# Patient Record
Sex: FEMALE | Race: WHITE | Employment: OTHER | URBAN - NONMETROPOLITAN AREA
[De-identification: names, ages, dates, MRNs, and addresses within clinical notes are randomized per-mention and may not be internally consistent; named-entity substitution may affect disease eponyms.]

---

## 2017-04-05 ENCOUNTER — OFFICE VISIT (OUTPATIENT)
Dept: FAMILY MEDICINE CLINIC | Age: 52
End: 2017-04-05

## 2017-04-05 VITALS
TEMPERATURE: 98.1 F | WEIGHT: 191 LBS | DIASTOLIC BLOOD PRESSURE: 88 MMHG | BODY MASS INDEX: 33.84 KG/M2 | HEART RATE: 64 BPM | OXYGEN SATURATION: 98 % | SYSTOLIC BLOOD PRESSURE: 130 MMHG | HEIGHT: 63 IN

## 2017-04-05 DIAGNOSIS — E55.9 VITAMIN D DEFICIENCY: ICD-10-CM

## 2017-04-05 DIAGNOSIS — G89.29 HEEL PAIN, CHRONIC, LEFT: Primary | ICD-10-CM

## 2017-04-05 DIAGNOSIS — R53.83 OTHER FATIGUE: ICD-10-CM

## 2017-04-05 DIAGNOSIS — Z13.220 LIPID SCREENING: ICD-10-CM

## 2017-04-05 DIAGNOSIS — D50.9 MICROCYTIC ANEMIA: ICD-10-CM

## 2017-04-05 DIAGNOSIS — M79.672 HEEL PAIN, CHRONIC, LEFT: Primary | ICD-10-CM

## 2017-04-05 DIAGNOSIS — Z12.11 COLON CANCER SCREENING: ICD-10-CM

## 2017-04-05 LAB
ALBUMIN SERPL-MCNC: 3.9 G/DL (ref 3.9–4.9)
ALP BLD-CCNC: 153 U/L (ref 40–130)
ALT SERPL-CCNC: 29 U/L (ref 0–33)
ANION GAP SERPL CALCULATED.3IONS-SCNC: 12 MEQ/L (ref 7–13)
AST SERPL-CCNC: 36 U/L (ref 0–35)
BASOPHILS ABSOLUTE: 0.1 K/UL (ref 0–0.2)
BASOPHILS RELATIVE PERCENT: 1 %
BILIRUB SERPL-MCNC: 0.4 MG/DL (ref 0–1.2)
BUN BLDV-MCNC: 11 MG/DL (ref 6–20)
CALCIUM SERPL-MCNC: 9.1 MG/DL (ref 8.6–10.2)
CHLORIDE BLD-SCNC: 106 MEQ/L (ref 98–107)
CHOLESTEROL, TOTAL: 203 MG/DL (ref 0–199)
CO2: 24 MEQ/L (ref 22–29)
CREAT SERPL-MCNC: 0.74 MG/DL (ref 0.5–0.9)
EOSINOPHILS ABSOLUTE: 0.1 K/UL (ref 0–0.7)
EOSINOPHILS RELATIVE PERCENT: 1.6 %
GFR AFRICAN AMERICAN: >60
GFR NON-AFRICAN AMERICAN: >60
GLOBULIN: 3.4 G/DL (ref 2.3–3.5)
GLUCOSE BLD-MCNC: 86 MG/DL (ref 74–109)
HCT VFR BLD CALC: 41.7 % (ref 37–47)
HDLC SERPL-MCNC: 50 MG/DL (ref 40–59)
HEMOGLOBIN: 13.8 G/DL (ref 12–16)
IRON SATURATION: 17 % (ref 11–46)
IRON: 58 UG/DL (ref 37–145)
LDL CHOLESTEROL CALCULATED: 139 MG/DL (ref 0–129)
LYMPHOCYTES ABSOLUTE: 2.3 K/UL (ref 1–4.8)
LYMPHOCYTES RELATIVE PERCENT: 28.2 %
MCH RBC QN AUTO: 29.3 PG (ref 27–31.3)
MCHC RBC AUTO-ENTMCNC: 33.1 % (ref 33–37)
MCV RBC AUTO: 88.5 FL (ref 82–100)
MONOCYTES ABSOLUTE: 0.6 K/UL (ref 0.2–0.8)
MONOCYTES RELATIVE PERCENT: 7 %
NEUTROPHILS ABSOLUTE: 5.1 K/UL (ref 1.4–6.5)
NEUTROPHILS RELATIVE PERCENT: 62.2 %
PDW BLD-RTO: 14.1 % (ref 11.5–14.5)
PLATELET # BLD: 284 K/UL (ref 130–400)
POTASSIUM SERPL-SCNC: 4.9 MEQ/L (ref 3.5–5.1)
RBC # BLD: 4.71 M/UL (ref 4.2–5.4)
SODIUM BLD-SCNC: 142 MEQ/L (ref 132–144)
TOTAL IRON BINDING CAPACITY: 339 UG/DL (ref 178–450)
TOTAL PROTEIN: 7.3 G/DL (ref 6.4–8.1)
TRIGL SERPL-MCNC: 71 MG/DL (ref 0–200)
TSH SERPL DL<=0.05 MIU/L-ACNC: 1.35 UIU/ML (ref 0.27–4.2)
VITAMIN D 25-HYDROXY: 25.2 NG/ML (ref 30–100)
WBC # BLD: 8.1 K/UL (ref 4.8–10.8)

## 2017-04-05 PROCEDURE — 99213 OFFICE O/P EST LOW 20 MIN: CPT | Performed by: NURSE PRACTITIONER

## 2017-04-05 ASSESSMENT — ENCOUNTER SYMPTOMS
SHORTNESS OF BREATH: 0
TROUBLE SWALLOWING: 0
COUGH: 0

## 2017-05-16 ENCOUNTER — TELEPHONE (OUTPATIENT)
Dept: FAMILY MEDICINE CLINIC | Age: 52
End: 2017-05-16

## 2017-05-17 ENCOUNTER — TELEPHONE (OUTPATIENT)
Dept: FAMILY MEDICINE CLINIC | Age: 52
End: 2017-05-17

## 2017-05-17 DIAGNOSIS — Z12.11 COLON CANCER SCREENING: Primary | ICD-10-CM

## 2017-05-17 LAB
CONTROL: ABNORMAL
HEMOCCULT STL QL: ABNORMAL

## 2017-05-17 PROCEDURE — 82274 ASSAY TEST FOR BLOOD FECAL: CPT | Performed by: FAMILY MEDICINE

## 2017-05-24 ENCOUNTER — ANESTHESIA (OUTPATIENT)
Dept: ENDOSCOPY | Age: 52
End: 2017-05-24
Payer: COMMERCIAL

## 2017-05-24 ENCOUNTER — ANESTHESIA EVENT (OUTPATIENT)
Dept: ENDOSCOPY | Age: 52
End: 2017-05-24
Payer: COMMERCIAL

## 2017-05-24 ENCOUNTER — HOSPITAL ENCOUNTER (OUTPATIENT)
Age: 52
Setting detail: OUTPATIENT SURGERY
Discharge: HOME | End: 2017-05-24
Attending: INTERNAL MEDICINE | Admitting: INTERNAL MEDICINE
Payer: COMMERCIAL

## 2017-05-24 VITALS
DIASTOLIC BLOOD PRESSURE: 67 MMHG | TEMPERATURE: 98.6 F | OXYGEN SATURATION: 98 % | BODY MASS INDEX: 31.01 KG/M2 | HEIGHT: 63 IN | HEART RATE: 70 BPM | SYSTOLIC BLOOD PRESSURE: 113 MMHG | WEIGHT: 175 LBS | RESPIRATION RATE: 18 BRPM

## 2017-05-24 VITALS
RESPIRATION RATE: 16 BRPM | OXYGEN SATURATION: 96 % | SYSTOLIC BLOOD PRESSURE: 148 MMHG | DIASTOLIC BLOOD PRESSURE: 83 MMHG

## 2017-05-24 PROCEDURE — 3609027000 HC COLONOSCOPY: Performed by: INTERNAL MEDICINE

## 2017-05-24 PROCEDURE — 7100000010 HC PHASE II RECOVERY - FIRST 15 MIN: Performed by: INTERNAL MEDICINE

## 2017-05-24 PROCEDURE — 3700000000 HC ANESTHESIA ATTENDED CARE: Performed by: INTERNAL MEDICINE

## 2017-05-24 PROCEDURE — 2580000003 HC RX 258: Performed by: STUDENT IN AN ORGANIZED HEALTH CARE EDUCATION/TRAINING PROGRAM

## 2017-05-24 PROCEDURE — 6360000002 HC RX W HCPCS: Performed by: NURSE ANESTHETIST, CERTIFIED REGISTERED

## 2017-05-24 PROCEDURE — 3700000001 HC ADD 15 MINUTES (ANESTHESIA): Performed by: INTERNAL MEDICINE

## 2017-05-24 PROCEDURE — 2500000003 HC RX 250 WO HCPCS: Performed by: NURSE ANESTHETIST, CERTIFIED REGISTERED

## 2017-05-24 RX ORDER — SODIUM CHLORIDE 9 MG/ML
INJECTION, SOLUTION INTRAVENOUS CONTINUOUS
Status: DISCONTINUED | OUTPATIENT
Start: 2017-05-24 | End: 2017-05-24 | Stop reason: HOSPADM

## 2017-05-24 RX ORDER — ONDANSETRON 2 MG/ML
4 INJECTION INTRAMUSCULAR; INTRAVENOUS
Status: DISCONTINUED | OUTPATIENT
Start: 2017-05-24 | End: 2017-05-24 | Stop reason: HOSPADM

## 2017-05-24 RX ORDER — PROPOFOL 10 MG/ML
INJECTION, EMULSION INTRAVENOUS PRN
Status: DISCONTINUED | OUTPATIENT
Start: 2017-05-24 | End: 2017-05-24 | Stop reason: SDUPTHER

## 2017-05-24 RX ORDER — GLYCOPYRROLATE 0.2 MG/ML
INJECTION INTRAMUSCULAR; INTRAVENOUS PRN
Status: DISCONTINUED | OUTPATIENT
Start: 2017-05-24 | End: 2017-05-24 | Stop reason: SDUPTHER

## 2017-05-24 RX ADMIN — GLYCOPYRROLATE 0.2 MG: 0.2 INJECTION INTRAMUSCULAR; INTRAVENOUS at 14:58

## 2017-05-24 RX ADMIN — PROPOFOL 20 MG: 10 INJECTION, EMULSION INTRAVENOUS at 14:54

## 2017-05-24 RX ADMIN — PROPOFOL 20 MG: 10 INJECTION, EMULSION INTRAVENOUS at 14:57

## 2017-05-24 RX ADMIN — PROPOFOL 20 MG: 10 INJECTION, EMULSION INTRAVENOUS at 14:58

## 2017-05-24 RX ADMIN — PROPOFOL 20 MG: 10 INJECTION, EMULSION INTRAVENOUS at 14:48

## 2017-05-24 RX ADMIN — PROPOFOL 20 MG: 10 INJECTION, EMULSION INTRAVENOUS at 14:55

## 2017-05-24 RX ADMIN — PROPOFOL 50 MG: 10 INJECTION, EMULSION INTRAVENOUS at 14:47

## 2017-05-24 RX ADMIN — SODIUM CHLORIDE: 9 INJECTION, SOLUTION INTRAVENOUS at 13:54

## 2017-05-24 RX ADMIN — PROPOFOL 10 MG: 10 INJECTION, EMULSION INTRAVENOUS at 14:52

## 2017-05-24 RX ADMIN — PROPOFOL 20 MG: 10 INJECTION, EMULSION INTRAVENOUS at 14:53

## 2017-05-24 RX ADMIN — PROPOFOL 50 MG: 10 INJECTION, EMULSION INTRAVENOUS at 14:46

## 2017-05-24 RX ADMIN — PROPOFOL 50 MG: 10 INJECTION, EMULSION INTRAVENOUS at 14:50

## 2017-05-24 RX ADMIN — PROPOFOL 20 MG: 10 INJECTION, EMULSION INTRAVENOUS at 14:56

## 2017-05-24 RX ADMIN — PROPOFOL 50 MG: 10 INJECTION, EMULSION INTRAVENOUS at 14:49

## 2017-05-24 RX ADMIN — PROPOFOL 10 MG: 10 INJECTION, EMULSION INTRAVENOUS at 14:51

## 2017-05-24 ASSESSMENT — PAIN - FUNCTIONAL ASSESSMENT: PAIN_FUNCTIONAL_ASSESSMENT: 0-10

## 2017-05-25 ENCOUNTER — OFFICE VISIT (OUTPATIENT)
Dept: GASTROENTEROLOGY | Age: 52
End: 2017-05-25

## 2017-05-25 VITALS
SYSTOLIC BLOOD PRESSURE: 150 MMHG | DIASTOLIC BLOOD PRESSURE: 90 MMHG | HEART RATE: 80 BPM | BODY MASS INDEX: 31 KG/M2 | WEIGHT: 175 LBS

## 2017-05-25 DIAGNOSIS — R19.5 POSITIVE FIT (FECAL IMMUNOCHEMICAL TEST): Primary | ICD-10-CM

## 2017-05-25 PROCEDURE — 99203 OFFICE O/P NEW LOW 30 MIN: CPT | Performed by: INTERNAL MEDICINE

## 2017-07-31 ENCOUNTER — OFFICE VISIT (OUTPATIENT)
Dept: FAMILY MEDICINE CLINIC | Age: 52
End: 2017-07-31

## 2017-07-31 VITALS
SYSTOLIC BLOOD PRESSURE: 116 MMHG | TEMPERATURE: 98.9 F | HEART RATE: 60 BPM | WEIGHT: 187 LBS | OXYGEN SATURATION: 99 % | BODY MASS INDEX: 33.13 KG/M2 | DIASTOLIC BLOOD PRESSURE: 78 MMHG | HEIGHT: 63 IN

## 2017-07-31 DIAGNOSIS — E66.9 OBESITY, UNSPECIFIED OBESITY SEVERITY, UNSPECIFIED OBESITY TYPE: Primary | ICD-10-CM

## 2017-07-31 PROCEDURE — 99213 OFFICE O/P EST LOW 20 MIN: CPT | Performed by: NURSE PRACTITIONER

## 2017-07-31 RX ORDER — PHENTERMINE HYDROCHLORIDE 37.5 MG/1
37.5 TABLET ORAL
Qty: 30 TABLET | Refills: 0 | Status: SHIPPED | OUTPATIENT
Start: 2017-07-31 | End: 2017-08-28 | Stop reason: SDUPTHER

## 2017-07-31 ASSESSMENT — ENCOUNTER SYMPTOMS
COUGH: 0
SHORTNESS OF BREATH: 0

## 2017-07-31 ASSESSMENT — PATIENT HEALTH QUESTIONNAIRE - PHQ9
SUM OF ALL RESPONSES TO PHQ QUESTIONS 1-9: 0
2. FEELING DOWN, DEPRESSED OR HOPELESS: 0
1. LITTLE INTEREST OR PLEASURE IN DOING THINGS: 0
SUM OF ALL RESPONSES TO PHQ9 QUESTIONS 1 & 2: 0

## 2017-08-28 ENCOUNTER — OFFICE VISIT (OUTPATIENT)
Dept: FAMILY MEDICINE CLINIC | Age: 52
End: 2017-08-28

## 2017-08-28 VITALS
BODY MASS INDEX: 32.5 KG/M2 | TEMPERATURE: 97.7 F | OXYGEN SATURATION: 96 % | HEIGHT: 62 IN | SYSTOLIC BLOOD PRESSURE: 110 MMHG | WEIGHT: 176.6 LBS | HEART RATE: 80 BPM | DIASTOLIC BLOOD PRESSURE: 78 MMHG

## 2017-08-28 DIAGNOSIS — E66.9 OBESITY, UNSPECIFIED OBESITY SEVERITY, UNSPECIFIED OBESITY TYPE: ICD-10-CM

## 2017-08-28 PROCEDURE — 99212 OFFICE O/P EST SF 10 MIN: CPT | Performed by: NURSE PRACTITIONER

## 2017-08-28 RX ORDER — PHENTERMINE HYDROCHLORIDE 37.5 MG/1
37.5 TABLET ORAL
Qty: 30 TABLET | Refills: 0 | Status: SHIPPED | OUTPATIENT
Start: 2017-08-28 | End: 2017-09-27

## 2017-10-02 ENCOUNTER — OFFICE VISIT (OUTPATIENT)
Dept: FAMILY MEDICINE CLINIC | Age: 52
End: 2017-10-02

## 2017-10-02 VITALS
HEIGHT: 63 IN | TEMPERATURE: 97.6 F | OXYGEN SATURATION: 97 % | DIASTOLIC BLOOD PRESSURE: 80 MMHG | BODY MASS INDEX: 30.72 KG/M2 | WEIGHT: 173.4 LBS | HEART RATE: 62 BPM | SYSTOLIC BLOOD PRESSURE: 132 MMHG

## 2017-10-02 DIAGNOSIS — E66.9 OBESITY, UNSPECIFIED OBESITY SEVERITY, UNSPECIFIED OBESITY TYPE: Primary | ICD-10-CM

## 2017-10-02 PROCEDURE — 99212 OFFICE O/P EST SF 10 MIN: CPT | Performed by: NURSE PRACTITIONER

## 2017-10-02 RX ORDER — PHENTERMINE HYDROCHLORIDE 37.5 MG/1
37.5 TABLET ORAL
Qty: 30 TABLET | Refills: 0 | Status: SHIPPED | OUTPATIENT
Start: 2017-10-02 | End: 2017-11-01

## 2017-10-02 ASSESSMENT — ENCOUNTER SYMPTOMS
COUGH: 0
SHORTNESS OF BREATH: 0

## 2017-10-02 NOTE — PROGRESS NOTES
Linseed, (FLAXSEED OIL PO) Take by mouth       No current facility-administered medications on file prior to visit. Allergies   Allergen Reactions    Penicillins Hives       Review of Systems   Constitutional: Negative for fatigue. Respiratory: Negative for cough and shortness of breath. Cardiovascular: Negative for chest pain. Objective  Vitals:    10/02/17 0803   BP: 132/80   Site: Left Arm   Position: Sitting   Cuff Size: Medium Adult   Pulse: 62   Temp: 97.6 °F (36.4 °C)   TempSrc: Tympanic   SpO2: 97%   Weight: 173 lb 6.4 oz (78.7 kg)   Height: 5' 3\" (1.6 m)     Physical Exam   Constitutional: She is oriented to person, place, and time. She appears well-developed and well-nourished. Cardiovascular: Normal rate, regular rhythm, normal heart sounds and intact distal pulses. Pulmonary/Chest: Effort normal and breath sounds normal.   Neurological: She is alert and oriented to person, place, and time. Psychiatric: She has a normal mood and affect. Her behavior is normal. Judgment and thought content normal.         Assessment & Plan    1. Obesity, unspecified obesity severity, unspecified obesity type  phentermine (ADIPEX-P) 37.5 MG tablet       No orders of the defined types were placed in this encounter. Orders Placed This Encounter   Medications    phentermine (ADIPEX-P) 37.5 MG tablet     Sig: Take 1 tablet by mouth every morning (before breakfast)     Dispense:  30 tablet     Refill:  0       Side effects, adverse effects of the medication prescribed today, as well as treatment plan/ rationale and result expectations have been discussed with the patient who expresses understanding and desires to proceed. Close follow up to evaluate treatment results and for coordination of care. I have reviewed the patient's medical history in detail and updated the computerized patient record.     As always, patient is advised that if symptoms worsen in any way they will proceed to the nearest emergency room.      We will fu keila Estrada NP

## 2017-10-02 NOTE — MR AVS SNAPSHOT
Your BMI as listed above is considered obese (30 or more). BMI is an estimate of body fat, calculated from your height and weight. The higher your BMI, the greater your risk of heart disease, high blood pressure, type 2 diabetes, stroke, gallstones, arthritis, sleep apnea, and certain cancers. BMI is not perfect. It may overestimate body fat in athletes and people who are more muscular. Even a small weight loss (between 5 and 10 percent of your current weight) by decreasing your calorie intake and becoming more physically active will help lower your risk of developing or worsening diseases associated with obesity. Learn more at: [email or web address]             Today's Medication Changes          These changes are accurate as of: 10/2/17  8:15 AM.  If you have any questions, ask your nurse or doctor. START taking these medications           phentermine 37.5 MG tablet   Commonly known as:  ADIPEX-P   Instructions:   Take 1 tablet by mouth every morning (before breakfast)   Quantity:  30 tablet   Refills:  0   Started by:  Anisha Barboza NP            Where to Get Your Medications      These medications were sent to [facility] [address] - P [phone] - F [phone] [address] [address]     Phone:  [phone]     phentermine 37.5 MG tablet               Your Current Medications Are              phentermine (ADIPEX-P) 37.5 MG tablet Take 1 tablet by mouth every morning (before breakfast)    aspirin 81 MG tablet Take 81 mg by mouth daily    IRON PO Take by mouth    Multiple Vitamins-Minerals (MULTIVITAMIN ADULT PO) Take by mouth    Cholecalciferol (VITAMIN D PO) Take by mouth    Flaxseed, Linseed, (FLAXSEED OIL PO) Take by mouth      Allergies              Penicillins Hives         Additional Information        Basic Information     Date Of Birth Sex Race Ethnicity Preferred Language

## 2017-10-05 ENCOUNTER — OFFICE VISIT (OUTPATIENT)
Dept: OBGYN | Age: 52
End: 2017-10-05

## 2017-10-05 VITALS
HEIGHT: 63 IN | WEIGHT: 171 LBS | SYSTOLIC BLOOD PRESSURE: 122 MMHG | BODY MASS INDEX: 30.3 KG/M2 | DIASTOLIC BLOOD PRESSURE: 80 MMHG

## 2017-10-05 DIAGNOSIS — Z01.419 PAP SMEAR, AS PART OF ROUTINE GYNECOLOGICAL EXAMINATION: ICD-10-CM

## 2017-10-05 DIAGNOSIS — Z11.51 SCREENING FOR HPV (HUMAN PAPILLOMAVIRUS): ICD-10-CM

## 2017-10-05 DIAGNOSIS — Z01.419 WELL WOMAN EXAM WITH ROUTINE GYNECOLOGICAL EXAM: Primary | ICD-10-CM

## 2017-10-05 DIAGNOSIS — Z12.31 SCREENING MAMMOGRAM, ENCOUNTER FOR: ICD-10-CM

## 2017-10-05 PROCEDURE — 99386 PREV VISIT NEW AGE 40-64: CPT | Performed by: NURSE PRACTITIONER

## 2017-10-05 NOTE — MR AVS SNAPSHOT
After Visit Summary             Sherly Shah   10/5/2017 10:00 AM   Office Visit    Description:  Female : 1965   Provider:  Raquel Johnson CNP   Department:  [facility]              Your Follow-Up and Future Appointments         Below is a list of your follow-up and future appointments. This may not be a complete list as you may have made appointments directly with providers that we are not aware of or your providers may have made some for you. Please call your providers to confirm appointments. It is important to keep your appointments. Please bring your current insurance card, photo ID, co-pay, and all medication bottles to your appointment. If self-pay, payment is expected at the time of service. Your To-Do List     Future Appointments Provider Department Dept Phone    2017 8:00 AM JOSI Posadas [phone]    Please arrive 15 minutes prior to appointment, bring photo ID and insurance card. 10/9/2018 9:00 AM Raquel Johnson CNP [facility] [phone]    Future Orders Complete By Expires    YAYA Screening Bilateral [ Custom]  10/5/2017 2018    PAP SMEAR [LAB4 Custom]  10/5/2017 10/5/2018    Comments:    Patient History:    No LMP recorded. Patient has had an implant. OBGYN Status: Implant  Past Surgical History:  No date:  SECTION      Comment: X2  No date: EYE SURGERY Bilateral      Comment: lasik  No date: INCONTINENCE SURGERY  2017: [place] COLON CA SCRN NOT HI RSK IND N/A      Comment: COLONOSCOPY performed by German Bullard MD at [facility]  No date: TUBAL LIGATION      Smoking status: Never Smoker                                                              Smokeless status: Never Used                          Follow-Up    Return in about 1 year (around 10/5/2018), or if symptoms worsen or fail to improve, for Well Woman visit.          Information from Your Visit        Department Name Address Phone Fax    [facility] [address] [phone]      You Were Seen for:         Comments    Pap smear, as part of routine gynecological examination   [[id number]]         Vital Signs     Blood Pressure Height Weight Breastfeeding? Body Mass Index       122/80 5' 3\" (1.6 m) 171 lb (77.6 kg) No 30.29 kg/m2     Smoking Status                   Never Smoker           Additional Information about your Body Mass Index (BMI)           Your BMI as listed above is considered obese (30 or more). BMI is an estimate of body fat, calculated from your height and weight. The higher your BMI, the greater your risk of heart disease, high blood pressure, type 2 diabetes, stroke, gallstones, arthritis, sleep apnea, and certain cancers. BMI is not perfect. It may overestimate body fat in athletes and people who are more muscular. Even a small weight loss (between 5 and 10 percent of your current weight) by decreasing your calorie intake and becoming more physically active will help lower your risk of developing or worsening diseases associated with obesity. Learn more at: [employer]          Instructions         Well Visit, Women 48 to 72: Care Instructions  Your Care Instructions  Physical exams can help you stay healthy. Your doctor has checked your overall health and may have suggested ways to take good care of yourself. He or she also may have recommended tests. At home, you can help prevent illness with healthy eating, regular exercise, and other steps. Follow-up care is a key part of your treatment and safety. Be sure to make and go to all appointments, and call your doctor if you are having problems. It's also a good idea to know your test results and keep a list of the medicines you take. How can you care for yourself at home? · Reach and stay at a healthy weight.  This will lower your risk for many glaucoma and other age-related eye problems starting at age 48. · Hearing. Tell your doctor if you notice any change in your hearing. You can have tests to find out how well you hear. · Diabetes. Ask your doctor whether you should have tests for diabetes. · Colon cancer. You should begin tests for colon cancer at age 48. You may have one of several tests. Your doctor will tell you how often to have tests based on your age and risk. Risks include whether you already had a precancerous polyp removed from your colon or whether your parents, sisters and brothers, or children have had colon cancer. · Thyroid disease. Talk to your doctor about whether to have your thyroid checked as part of a regular physical exam. Women have an increased chance of a thyroid problem. · Osteoporosis. You should begin tests for bone density at age 72. If you are younger than 72, ask your doctor whether you have factors that may increase your risk for this disease. You may want to have this test before age 72. · Heart attack and stroke risk. At least every 4 to 6 years, you should have your risk for heart attack and stroke assessed. Your doctor uses factors such as your age, blood pressure, cholesterol, and whether you smoke or have diabetes to show what your risk for a heart attack or stroke is over the next 10 years. When should you call for help? Watch closely for changes in your health, and be sure to contact your doctor if you have any problems or symptoms that concern you. Where can you learn more? Go to [email or web address] and sign in to your [employer] account. Enter L760 in the [facility] box to learn more about \"Well Visit, Women 50 to 72: Care Instructions. \"     If you do not have an account, please click on the \"Sign Up Now\" link. Current as of: July 19, 2016  Content Version: 11.3  © 8433-2567 [employer].  Care instructions adapted under license by [facility] ([facility]). If you have questions about a medical condition or this instruction, always ask your healthcare professional. [place] [address] any warranty or liability for your use of this information. Breast Self-Exam: Care Instructions  Your Care Instructions  A breast self-exam is when you check your breasts for lumps or changes. This regular exam helps you learn how your breasts normally look and feel. Most breast problems or changes are not because of cancer. Breast self-exam is not a substitute for a mammogram. Having regular breast exams by your doctor and regular mammograms improve your chances of finding any problems with your breasts. Some women set a time each month to do a step-by-step breast self-exam. Other women like a less formal system. They might look at their breasts as they brush their teeth, or feel their breasts once in a while in the shower. If you notice a change in your breast, tell your doctor. Follow-up care is a key part of your treatment and safety. Be sure to make and go to all appointments, and call your doctor if you are having problems. Its also a good idea to know your test results and keep a list of the medicines you take. How do you do a breast self-exam?  · The best time to examine your breasts is usually one week after your menstrual period begins. Your breasts should not be tender then. If you do not have periods, you might do your exam on a day of the month that is easy to remember. · To examine your breasts:  ¨ Remove all your clothes above the waist and lie down. When you are lying down, your breast tissue spreads evenly over your chest wall, which makes it easier to feel all your breast tissue. ¨ Use the padsnot the fingertipsof the 3 middle fingers of your left hand to check your right breast. Move your fingers slowly in small coin-sized circles that overlap. ¨ Use three levels of pressure to feel of all your breast tissue.  Use Others keep having periods well into their 50s. And some women go through menopause early because of cancer treatment or surgery to remove the ovaries. What can you expect with menopause? · It starts with perimenopause. This is the process of change that leads up to menopause. Perimenopause can start as early as your late 35s or as late as your early 46s. How long it lasts varies. But it usually lasts from 2 to 8 years. · During this time, your hormone levels will go up and down unevenly (fluctuate). This causes changes in your periods and other symptoms. In time, estrogen and progesterone levels drop enough that the menstrual cycle stops. Going a full year without having a period is usually considered menopause. · Low estrogen levels after menopause speed bone loss. This increases your risk of osteoporosis. Also, your risk of heart disease increases after menopause. · It's normal to have thinner, dryer, wrinkled skin after menopause. The vaginal lining and the lower urinary tract also thin and weaken. This can make it hard to have sex. It can also increase the risk of vaginal and urinary tract infections. What are the symptoms? · Lighter or heavier periods. Your menstrual cycle may be longer or shorter. You may skip periods. · Hot flashes. You may have a sudden feeling of intense body heat. You may sweat, and your head, neck, and chest may get red. Along with hot flashes, you may have a heartbeat that's too fast or not regular. You may also feel anxious or grouchy. In rare cases you might feel panic. · Trouble sleeping. · Mood swings or feeling grouchy, depressed, or worried. · Problems with remembering or thinking clearly. · Vaginal dryness. Some women have only a few mild symptoms. Others have severe symptoms that disrupt their sleep and daily lives. Symptoms tend to last or get worse the first year or more after menopause.  Over time, hormones even out at low levels. Many symptoms improve or go away. But some women may have symptoms that don't go away. How are menopause symptoms treated? If you have mild symptoms, you may get some relief if you eat healthy foods, exercise, and lower your stress. Some women choose to take medicines if they have severe symptoms that aren't helped by making changes to their lifestyle. Lifestyle changes  · Choose a heart-healthy diet that is low in saturated fat. It should include plenty of fish, fruits, vegetables, beans, and high-fiber grains and breads. Be sure you get enough calcium and vitamin D to help your bones stay strong. Low-fat or nonfat dairy products are a great source of calcium. · Get regular exercise. Exercise can help you manage your weight, keep your heart and bones strong, and lift your mood. · Limit caffeine, alcohol, and stress. These things can make symptoms worse. Limiting them may help you sleep better. · If you smoke, stop. Quitting smoking can reduce hot flashes and long-term health risks. Medicines  If your symptoms are severe, talk with your doctor. You may want to try prescription medicines, such as:  · Low-dose birth control pills before menopause. · Low-dose hormone therapy (HT) after menopause. · Antidepressants. · A medicine called clonidine (Catapres) that is usually used to treat high blood pressure. All medicines for menopause symptoms have possible risks or side effects. A very small number of women develop serious health problems when taking hormone therapy. Be sure to talk to your doctor about your possible health risks before you start a treatment for menopause symptoms. Other treatments  You can try:  · Breathing exercises. They may help reduce hot flashes and emotional symptoms. · Soy. Some women feel that eating lots of soy helps even out their menopause symptoms. · Yoga or biofeedback. They may help reduce stress. bones. Your doctor can tell you the best ways to protect your bones from thinning. Follow-up care is a key part of your treatment and safety. Be sure to make and go to all appointments, and call your doctor if you are having problems. It's also a good idea to know your test results and keep a list of the medicines you take. How can you care for yourself at home? · Get enough calcium and vitamin D. The [place] of Medicine recommends adults younger than age 46 need 1,000 mg of calcium and 600 IU of vitamin D each day. Women ages 46 to 79 need 1,200 mg of calcium and 600 IU of vitamin D each day. Men ages 46 to 79 need 1,000 mg of calcium and 600 IU of vitamin D each day. Adults 71 and older need 1,200 mg of calcium and 800 IU of vitamin D each day. ¨ Eat foods rich in calcium, like yogurt, cheese, milk, and dark green vegetables. ¨ Eat foods rich in vitamin D, like eggs, fatty fish, cereal, and fortified milk. ¨ Get some sunshine. Your body uses sunshine to make its own vitamin D. The safest time to be out in the sun is before 10 a.m. or after 3 p.m. Avoid getting sunburned. Sunburn can increase your risk of skin cancer. ¨ Talk to your doctor about taking a calcium plus vitamin D supplement. Ask about what type of calcium is right for you, and how much to take at a time. Adults ages 23 to 48 should not get more than 2,500 mg of calcium and 4,000 IU of vitamin D each day, whether it is from supplements and/or food. Adults ages 46 and older should not get more than 2,000 mg of calcium and 4,000 IU of vitamin D each day from supplements and/or food. · Get regular bone-building exercise. Weight-bearing and resistance exercises keep bones healthy by working the muscles and bones against gravity. Start out at an exercise level that feels right for you. Add a little at a time until you can do the following:  ¨ Do 30 minutes of weight-bearing exercise on most days of the week. Your Current Medications Are              phentermine (ADIPEX-P) 37.5 MG tablet Take 1 tablet by mouth every morning (before breakfast)    aspirin 81 MG tablet Take 81 mg by mouth daily    IRON PO Take by mouth    Multiple Vitamins-Minerals (MULTIVITAMIN ADULT PO) Take by mouth    Cholecalciferol (VITAMIN D PO) Take by mouth    Flaxseed, Linseed, (FLAXSEED OIL PO) Take by mouth      Allergies              Penicillins Hives         Additional Information        Basic Information     Date Of Birth Sex Race Ethnicity Preferred Language    1965 Female White Non-/Non  English      Problem List as of 10/5/2017  Date Reviewed: 10/5/2017                Family history of anemia    Hypercoagulopathy ([facility] [address]    Vertigo    Microcytic anemia    Vitamin D deficiency      Preventive Care        Date Due    Hepatitis C screening is recommended for all adults regardless of risk factors born between [facility] at least once (lifetime) who have never been tested. 1965    HIV screening is recommended for all people regardless of risk factors  aged 15-65 years at least once (lifetime) who have never been HIV tested. 1/6/1979    Tetanus Combination Vaccine (1 - Tdap) 1/6/1983    Pap Smear 1/6/1985    Yearly Flu Vaccine (1) 9/1/2017    Colon Cancer Stool Test 5/17/2018    Mammograms are recommended every 2 years for low/average risk patients aged 48 - 69, and every year for high risk patients per updated national guidelines. However these guidelines can be individualized by your provider. 5/20/2018    Cholesterol Screening 4/5/2022            apartum Signup           Our records indicate that you have an active apartum account. You can view your After Visit Summary by going to [email or web address] and logging in with your apartum username and password.       If you don't have a apartum username and password but a parent or

## 2017-10-05 NOTE — PATIENT INSTRUCTIONS
Well Visit, Women 48 to 72: Care Instructions  Your Care Instructions  Physical exams can help you stay healthy. Your doctor has checked your overall health and may have suggested ways to take good care of yourself. He or she also may have recommended tests. At home, you can help prevent illness with healthy eating, regular exercise, and other steps. Follow-up care is a key part of your treatment and safety. Be sure to make and go to all appointments, and call your doctor if you are having problems. It's also a good idea to know your test results and keep a list of the medicines you take. How can you care for yourself at home? · Reach and stay at a healthy weight. This will lower your risk for many problems, such as obesity, diabetes, heart disease, and high blood pressure. · Get at least 30 minutes of exercise on most days of the week. Walking is a good choice. You also may want to do other activities, such as running, swimming, cycling, or playing tennis or team sports. · Do not smoke. Smoking can make health problems worse. If you need help quitting, talk to your doctor about stop-smoking programs and medicines. These can increase your chances of quitting for good. · Protect your skin from too much sun. When you're outdoors from 10 a.m. to 4 p.m., stay in the shade or cover up with clothing and a hat with a wide brim. Wear sunglasses that block UV rays. Even when it's cloudy, put broad-spectrum sunscreen (SPF 30 or higher) on any exposed skin. · See a dentist one or two times a year for checkups and to have your teeth cleaned. · Wear a seat belt in the car. · Limit alcohol to 1 drink a day. Too much alcohol can cause health problems. Follow your doctor's advice about when to have certain tests. These tests can spot problems early. · Cholesterol.  Your doctor will tell you how often to have this done based on your age, family history, or other things that can increase your risk for heart attack and stroke. · Blood pressure. Have your blood pressure checked during a routine doctor visit. Your doctor will tell you how often to check your blood pressure based on your age, your blood pressure results, and other factors. · Mammogram. Ask your doctor how often you should have a mammogram, which is an X-ray of your breasts. A mammogram can spot breast cancer before it can be felt and when it is easiest to treat. · Pap test and pelvic exam. Ask your doctor how often you should have a Pap test. You may not need to have a Pap test as often as you used to. · Vision. Have your eyes checked every year or two or as often as your doctor suggests. Some experts recommend that you have yearly exams for glaucoma and other age-related eye problems starting at age 48. · Hearing. Tell your doctor if you notice any change in your hearing. You can have tests to find out how well you hear. · Diabetes. Ask your doctor whether you should have tests for diabetes. · Colon cancer. You should begin tests for colon cancer at age 48. You may have one of several tests. Your doctor will tell you how often to have tests based on your age and risk. Risks include whether you already had a precancerous polyp removed from your colon or whether your parents, sisters and brothers, or children have had colon cancer. · Thyroid disease. Talk to your doctor about whether to have your thyroid checked as part of a regular physical exam. Women have an increased chance of a thyroid problem. · Osteoporosis. You should begin tests for bone density at age 72. If you are younger than 72, ask your doctor whether you have factors that may increase your risk for this disease. You may want to have this test before age 72. · Heart attack and stroke risk. At least every 4 to 6 years, you should have your risk for heart attack and stroke assessed.  Your doctor uses factors such as your age, blood pressure, cholesterol, and whether you smoke or have diabetes to your breasts is usually one week after your menstrual period begins. Your breasts should not be tender then. If you do not have periods, you might do your exam on a day of the month that is easy to remember. · To examine your breasts:  ¨ Remove all your clothes above the waist and lie down. When you are lying down, your breast tissue spreads evenly over your chest wall, which makes it easier to feel all your breast tissue. ¨ Use the padsnot the fingertipsof the 3 middle fingers of your left hand to check your right breast. Move your fingers slowly in small coin-sized circles that overlap. ¨ Use three levels of pressure to feel of all your breast tissue. Use light pressure to feel the tissue close to the skin surface. Use medium pressure to feel a little deeper. Use firm pressure to feel your tissue close to your breastbone and ribs. Use each pressure level to feel your breast tissue before moving on to the next spot. ¨ Check your entire breast, moving up and down as if following a strip from the collarbone to the bra line, and from the armpit to the ribs. Repeat until you have covered the entire breast.  ¨ Repeat this procedure for your left breast, using the pads of the 3 middle fingers of your right hand. · To examine your breasts while in the shower:  ¨ Place one arm over your head and lightly soap your breast on that side. ¨ Using the pads of your fingers, gently move your hand over your breast (in the strip pattern described above), feeling carefully for any lumps or changes. ¨ Repeat for the other breast.  · Have your doctor inspect anything you notice to see if you need further testing. Where can you learn more? Go to https://Icarus Ascendingadriana.[employer]. org and sign in to your mTraks account. Enter P148 in the [employer] box to learn more about \"Breast Self-Exam: Care Instructions. \"     If you do not have an account, please click on the \"Sign Up Now\" link.   Current as of: July 26, she can help you decide. These recommendations do not apply to women who have had a serious abnormal Pap test result or who have certain health problems. Talk to your doctor about how often you should be tested. There is also a newer test called a primary HPV test. It is used in women ages 22 and older. And it is done every 3 years, as long as a woman's test results are normal. A woman who has this test doesn't need to have a Pap test.  Having the HPV vaccine does not change your need for screening tests. Women who have had the HPV vaccine should follow the same screening schedules as women who have not had the vaccine. What happens after the test?  The sample of cells taken during your test will be sent to a lab so that an expert can look at the cells. It usually takes a week or two to get the results back. · A normal result means that the test did not find any abnormal cells in the sample. · An abnormal result can mean many things. Most of these are not cancer. The results of your test may be abnormal because:  ¨ You have an infection of the vagina or cervix, such as a yeast infection. ¨ You have an IUD (intrauterine device for birth control). ¨ You have low estrogen levels after menopause that are causing the cells to change. ¨ You have cell changes that may be a sign of precancer or cancer. The results are ranked based on how serious the changes might be. Where can you learn more? Go to https://Ipsat Therapiesadriana.healthTheravance. org and sign in to your LoveThis account. Enter P919 in the [facility] box to learn more about \"Learning About Pap Tests. \"     If you do not have an account, please click on the \"Sign Up Now\" link. Current as of: July 26, 2016  Content Version: 11.3  © 8769-7758 [email or web address]. Care instructions adapted under license by [facility] ([facility]).  If you have questions about a medical condition or this instruction, always ask your healthcare professional. Roxann Olvera with hot flashes, you may have a heartbeat that's too fast or not regular. You may also feel anxious or grouchy. In rare cases you might feel panic. · Trouble sleeping. · Mood swings or feeling grouchy, depressed, or worried. · Problems with remembering or thinking clearly. · Vaginal dryness. Some women have only a few mild symptoms. Others have severe symptoms that disrupt their sleep and daily lives. Symptoms tend to last or get worse the first year or more after menopause. Over time, hormones even out at low levels. Many symptoms improve or go away. But some women may have symptoms that don't go away. How are menopause symptoms treated? If you have mild symptoms, you may get some relief if you eat healthy foods, exercise, and lower your stress. Some women choose to take medicines if they have severe symptoms that aren't helped by making changes to their lifestyle. Lifestyle changes  · Choose a heart-healthy diet that is low in saturated fat. It should include plenty of fish, fruits, vegetables, beans, and high-fiber grains and breads. Be sure you get enough calcium and vitamin D to help your bones stay strong. Low-fat or nonfat dairy products are a great source of calcium. · Get regular exercise. Exercise can help you manage your weight, keep your heart and bones strong, and lift your mood. · Limit caffeine, alcohol, and stress. These things can make symptoms worse. Limiting them may help you sleep better. · If you smoke, stop. Quitting smoking can reduce hot flashes and long-term health risks. Medicines  If your symptoms are severe, talk with your doctor. You may want to try prescription medicines, such as:  · Low-dose birth control pills before menopause. · Low-dose hormone therapy (HT) after menopause. · Antidepressants. · A medicine called clonidine (Catapres) that is usually used to treat high blood pressure. All medicines for menopause symptoms have possible risks or side effects.  A very small number of women develop serious health problems when taking hormone therapy. Be sure to talk to your doctor about your possible health risks before you start a treatment for menopause symptoms. Other treatments  You can try:  · Breathing exercises. They may help reduce hot flashes and emotional symptoms. · Soy. Some women feel that eating lots of soy helps even out their menopause symptoms. · Yoga or biofeedback. They may help reduce stress. Follow-up care is a key part of your treatment and safety. Be sure to make and go to all appointments, and call your doctor if you are having problems. It's also a good idea to know your test results and keep a list of the medicines you take. Where can you learn more? Go to https://MathZeepeCloudShield Technologieseb.Beatsy. org and sign in to your [employer]ostrong account. Enter H199 in the [employer] box to learn more about \"Learning About Menopause. \"     If you do not have an account, please click on the \"Sign Up Now\" link. Current as of: October 13, 2016  Content Version: 11.3  © 2047-7961 [email or web address]. Care instructions adapted under license by [facility] ([facility]). If you have questions about a medical condition or this instruction, always ask your healthcare professional. [place] [address] any warranty or liability for your use of this information. Preventing Osteoporosis: Care Instructions  Your Care Instructions  Osteoporosis means the bones are weak and thin enough that they can break easily. The older you are, the more likely you are to get osteoporosis. But with plenty of calcium, vitamin D, and exercise, you can help prevent osteoporosis. The preteen and teen years are a key time for bone building. With the help of calcium, vitamin D, and exercise in those early years and beyond, the bones reach their peak density and strength by age 27. After age 27, your bones naturally start to thin and weaken.   The stronger your bones are at around age 27, the lower your risk for osteoporosis. But no matter what your age and risk are, your bones still need calcium, vitamin D, and exercise to stay strong. Also avoid smoking, and limit alcohol. Smoking and heavy alcohol use can make your bones thinner. Talk to your doctor about any special risks you might have, such as having a close relative with osteoporosis or taking a medicine that can weaken bones. Your doctor can tell you the best ways to protect your bones from thinning. Follow-up care is a key part of your treatment and safety. Be sure to make and go to all appointments, and call your doctor if you are having problems. It's also a good idea to know your test results and keep a list of the medicines you take. How can you care for yourself at home? · Get enough calcium and vitamin D. The [place] of [employer] recommends adults younger than age 46 need 1,000 mg of calcium and 600 IU of vitamin D each day. Women ages 46 to 79 need 1,200 mg of calcium and 600 IU of vitamin D each day. Men ages 46 to 79 need 1,000 mg of calcium and 600 IU of vitamin D each day. Adults 71 and older need 1,200 mg of calcium and 800 IU of vitamin D each day. ¨ Eat foods rich in calcium, like yogurt, cheese, milk, and dark green vegetables. ¨ Eat foods rich in vitamin D, like eggs, fatty fish, cereal, and fortified milk. ¨ Get some sunshine. Your body uses sunshine to make its own vitamin D. The safest time to be out in the sun is before 10 a.m. or after 3 p.m. Avoid getting sunburned. Sunburn can increase your risk of skin cancer. ¨ Talk to your doctor about taking a calcium plus vitamin D supplement. Ask about what type of calcium is right for you, and how much to take at a time. Adults ages 23 to 48 should not get more than 2,500 mg of calcium and 4,000 IU of vitamin D each day, whether it is from supplements and/or food.  Adults ages 46 and older should not get more than 2,000 mg of calcium and 4,000 IU of vitamin D [facility] ([facility]). If you have questions about a medical condition or this instruction, always ask your healthcare professional. [place] [address] any warranty or liability for your use of this information.

## 2017-10-05 NOTE — PROGRESS NOTES
The patient was asked if she would like a chaperone present for her intimate exam. She  Declined the chaperone.  Rose Mo  Patient accepts Physician Assistant to be present and/or perform exam

## 2017-10-05 NOTE — PROGRESS NOTES
SUBJECTIVE: 47 y.o. [id number] female here for well woman exam. Pt is with no menses since 3 months after IUD was placed due to heavy periods. Pt has no complaints today. Pt reports menopause s/s as none. Pt denies smoking and drinks ETOH occasionally. Pt reports regular exercise and takes a multivitamin. Pt reports she has had a recent colonoscopy and is due for mammogram. Pt reports she has a h/o a \"blood clotting disorder\" and is concerned about her blot clot risk. Review of Systems:   General ROS: negative   Psychological ROS: negative   EENT ROS: negative   Endocrine ROS: negative   Respiratory ROS: no cough, shortness of breath, or wheezing   Cardiovascular ROS: no chest pain or dyspnea on exertion   Gastrointestinal ROS: no abdominal pain, change in bowel habits, or black or bloody stools   Genito-Urinary ROS: no dysuria, trouble voiding, or hematuria   Musculoskeletal ROS: negative   Neurological ROS: no TIA or stroke symptoms   Dermatological ROS: negative     OBJECTIVE: Please see chart for additional documentation   /80  Ht 5' 3\" (1.6 m)  Wt 171 lb (77.6 kg)  LMP Comment: Mirena (IUD)  Breastfeeding? No  BMI 30.29 kg/m2     Pt's medical and surgical history reviewed    Physical Exam:  GENERAL:  She appears well, afebrile. NEUROLOGIC: Alert and oriented to person, place and time  NECK: no thyroidmegaly  BREASTS: Bilateral breasts without masses, skin changes or nipple discharge   LUNGS: Clear to ascultation bilaterally  CVS: regular rate and rhythm  LYMPHATIC: No palpable lymph nodes  ABDOMEN: benign, soft, nontender, no masses, bowel sounds active all four quadrants. No liver or splenic organomegaly.    SKIN: atrophic changes noted, warm and dry, normal texture and tone, no lesions    Pelvic Exam:   EFG: normal external genitalia  URETHRA: normal appearing without diverticula or lesions  VULVA: normal appearing vulva with no masses, tenderness or lesions  VAGINA: normal rugae, white

## 2017-10-10 DIAGNOSIS — Z11.51 SCREENING FOR HPV (HUMAN PAPILLOMAVIRUS): ICD-10-CM

## 2017-10-10 DIAGNOSIS — Z01.419 PAP SMEAR, AS PART OF ROUTINE GYNECOLOGICAL EXAMINATION: ICD-10-CM

## 2017-11-02 ENCOUNTER — HOSPITAL ENCOUNTER (OUTPATIENT)
Dept: WOMENS IMAGING | Age: 52
Discharge: HOME | End: 2017-11-02
Payer: COMMERCIAL

## 2017-11-02 DIAGNOSIS — Z12.31 SCREENING MAMMOGRAM, ENCOUNTER FOR: ICD-10-CM

## 2017-11-02 PROCEDURE — G0202 SCR MAMMO BI INCL CAD: HCPCS

## 2018-10-23 ENCOUNTER — OFFICE VISIT (OUTPATIENT)
Dept: OBGYN CLINIC | Age: 53
End: 2018-10-23
Payer: COMMERCIAL

## 2018-10-23 VITALS
BODY MASS INDEX: 32.96 KG/M2 | DIASTOLIC BLOOD PRESSURE: 84 MMHG | HEIGHT: 63 IN | SYSTOLIC BLOOD PRESSURE: 122 MMHG | WEIGHT: 186 LBS

## 2018-10-23 DIAGNOSIS — Z97.5 IUD (INTRAUTERINE DEVICE) IN PLACE: ICD-10-CM

## 2018-10-23 DIAGNOSIS — Z12.31 SCREENING MAMMOGRAM, ENCOUNTER FOR: ICD-10-CM

## 2018-10-23 DIAGNOSIS — Z00.00 WELL WOMAN EXAM WITHOUT GYNECOLOGICAL EXAM: Primary | ICD-10-CM

## 2018-10-23 DIAGNOSIS — N95.1 PERIMENOPAUSE: ICD-10-CM

## 2018-10-23 PROCEDURE — 99396 PREV VISIT EST AGE 40-64: CPT | Performed by: NURSE PRACTITIONER

## 2018-10-23 ASSESSMENT — PATIENT HEALTH QUESTIONNAIRE - PHQ9
SUM OF ALL RESPONSES TO PHQ QUESTIONS 1-9: 1
2. FEELING DOWN, DEPRESSED OR HOPELESS: 1
SUM OF ALL RESPONSES TO PHQ QUESTIONS 1-9: 1
SUM OF ALL RESPONSES TO PHQ9 QUESTIONS 1 & 2: 1
1. LITTLE INTEREST OR PLEASURE IN DOING THINGS: 0

## 2018-10-23 NOTE — PATIENT INSTRUCTIONS
Patient Education        Well Visit, Women 48 to 72: Care Instructions  Your Care Instructions    Physical exams can help you stay healthy. Your doctor has checked your overall health and may have suggested ways to take good care of yourself. He or she also may have recommended tests. At home, you can help prevent illness with healthy eating, regular exercise, and other steps. Follow-up care is a key part of your treatment and safety. Be sure to make and go to all appointments, and call your doctor if you are having problems. It's also a good idea to know your test results and keep a list of the medicines you take. How can you care for yourself at home? · Reach and stay at a healthy weight. This will lower your risk for many problems, such as obesity, diabetes, heart disease, and high blood pressure. · Get at least 30 minutes of exercise on most days of the week. Walking is a good choice. You also may want to do other activities, such as running, swimming, cycling, or playing tennis or team sports. · Do not smoke. Smoking can make health problems worse. If you need help quitting, talk to your doctor about stop-smoking programs and medicines. These can increase your chances of quitting for good. · Protect your skin from too much sun. When you're outdoors from 10 a.m. to 4 p.m., stay in the shade or cover up with clothing and a hat with a wide brim. Wear sunglasses that block UV rays. Even when it's cloudy, put broad-spectrum sunscreen (SPF 30 or higher) on any exposed skin. · See a dentist one or two times a year for checkups and to have your teeth cleaned. · Wear a seat belt in the car. · Limit alcohol to 1 drink a day. Too much alcohol can cause health problems. Follow your doctor's advice about when to have certain tests. These tests can spot problems early. · Cholesterol.  Your doctor will tell you how often to have this done based on your age, family history, or other things that can increase your smoke or have diabetes to show what your risk for a heart attack or stroke is over the next 10 years. When should you call for help? Watch closely for changes in your health, and be sure to contact your doctor if you have any problems or symptoms that concern you. Where can you learn more? Go to https://chpepiceweb.healthFlow Studio. org and sign in to your [employer] account. Enter J958 in the [facility] box to learn more about \"Well Visit, Women 50 to 72: Care Instructions. \"     If you do not have an account, please click on the \"Sign Up Now\" link. Current as of: May 16, 2017  Content Version: 11.7  © 3195-8768 Tubis. Care instructions adapted under license by [facility] ([facility]). If you have questions about a medical condition or this instruction, always ask your healthcare professional. [place] [address] any warranty or liability for your use of this information. Patient Education        Breast Self-Exam: Care Instructions  Your Care Instructions    A breast self-exam is when you check your breasts for lumps or changes. This regular exam helps you learn how your breasts normally look and feel. Most breast problems or changes are not because of cancer. Breast self-exam is not a substitute for a mammogram. Having regular breast exams by your doctor and regular mammograms improve your chances of finding any problems with your breasts. Some women set a time each month to do a step-by-step breast self-exam. Other women like a less formal system. They might look at their breasts as they brush their teeth, or feel their breasts once in a while in the shower. If you notice a change in your breast, tell your doctor. Follow-up care is a key part of your treatment and safety. Be sure to make and go to all appointments, and call your doctor if you are having problems. It's also a good idea to know your test results and keep a list of the medicines you take.   How do you do a breast self-exam?  · The best time to examine your breasts is usually one week after your menstrual period begins. Your breasts should not be tender then. If you do not have periods, you might do your exam on a day of the month that is easy to remember. · To examine your breasts:  ¨ Remove all your clothes above the waist and lie down. When you are lying down, your breast tissue spreads evenly over your chest wall, which makes it easier to feel all your breast tissue. ¨ Use the pads-not the fingertips-of the 3 middle fingers of your left hand to check your right breast. Move your fingers slowly in small coin-sized circles that overlap. ¨ Use three levels of pressure to feel of all your breast tissue. Use light pressure to feel the tissue close to the skin surface. Use medium pressure to feel a little deeper. Use firm pressure to feel your tissue close to your breastbone and ribs. Use each pressure level to feel your breast tissue before moving on to the next spot. ¨ Check your entire breast, moving up and down as if following a strip from the collarbone to the bra line, and from the armpit to the ribs. Repeat until you have covered the entire breast.  ¨ Repeat this procedure for your left breast, using the pads of the 3 middle fingers of your right hand. · To examine your breasts while in the shower:  ¨ Place one arm over your head and lightly soap your breast on that side. ¨ Using the pads of your fingers, gently move your hand over your breast (in the strip pattern described above), feeling carefully for any lumps or changes. ¨ Repeat for the other breast.  · Have your doctor inspect anything you notice to see if you need further testing. Where can you learn more? Go to https://lorin.Hello! Messenger. org and sign in to your TeeBeeDee account. Enter P148 in the [facility] box to learn more about \"Breast Self-Exam: Care Instructions. \"     If you do not have an account, please click on the \"Sign Up Now\" link. Current as of: May 12, 2017  Content Version: 11.7  © 8107-6275 Artimi. Care instructions adapted under license by [facility] ([facility]). If you have questions about a medical condition or this instruction, always ask your healthcare professional. Maggy Rodrigez any warranty or liability for your use of this information. Patient Education        Learning About Cervical Cancer Screening  What is a cervical cancer screening test?    Cervical cancer screening tests check for cancer of the cervix. The cervix is the lower part of the uterus that opens into the vagina. The test can help your doctor find early changes in the cells that could lead to cancer. Two tests can be used to screen for cervical cancer. They may be used alone or together. A Pap test.   This test looks for changes in the cells of the cervix. Abnormal cells may be a sign of cancer. A human papillomavirus (HPV) test.   This test looks for the HPV virus. Some types of HPV can cause cancer. During either test, the doctor or nurse will insert a tool called a speculum into your vagina. The speculum gently spreads apart the vaginal walls. It allows your doctor to see inside the vagina and the cervix. He or she uses a small swab or brush to collect cell samples from your cervix. Try to schedule the test when you're not having your period. To get ready for the test, avoid douches, tampons, vaginal medicines, sprays, and powders for at least a day before you have the test.  When should you have a screening test?  These guidelines apply to women who are at average risk of cervical cancer. If you don't know your risk, talk with your doctor. Women 21 to 34  · You can start having a Pap test at age 24. It is done every 3 years. · You can start having the primary HPV test at age 22. It is done every 3 years.   Women 30 to 59  · If you had both a Pap test and an HPV test last time and both were normal, please click on the \"Sign Up Now\" link. Current as of: October 6, 2017  Content Version: 11.7  © [id number] Demandbase. Care instructions adapted under license by [facility] ([facility]). If you have questions about a medical condition or this instruction, always ask your healthcare professional. [address] any warranty or liability for your use of this information. Patient Education        Preventing Osteoporosis: Care Instructions  Your Care Instructions    Osteoporosis means the bones are weak and thin enough that they can break easily. The older you are, the more likely you are to get osteoporosis. But with plenty of calcium, vitamin D, and exercise, you can help prevent osteoporosis. The preteen and teen years are a key time for bone building. With the help of calcium, vitamin D, and exercise in those early years and beyond, the bones reach their peak density and strength by age 27. After age 27, your bones naturally start to thin and weaken. The stronger your bones are at around age 27, the lower your risk for osteoporosis. But no matter what your age and risk are, your bones still need calcium, vitamin D, and exercise to stay strong. Also avoid smoking, and limit alcohol. Smoking and heavy alcohol use can make your bones thinner. Talk to your doctor about any special risks you might have, such as having a close relative with osteoporosis or taking a medicine that can weaken bones. Your doctor can tell you the best ways to protect your bones from thinning. Follow-up care is a key part of your treatment and safety. Be sure to make and go to all appointments, and call your doctor if you are having problems. It's also a good idea to know your test results and keep a list of the medicines you take. How can you care for yourself at home? · Get enough calcium and vitamin D.  The [place] [employer] recommends adults younger than age 46 need 1,000 mg of calcium and 600 IU of

## 2018-11-06 ENCOUNTER — HOSPITAL ENCOUNTER (OUTPATIENT)
Dept: WOMENS IMAGING | Age: 53
Discharge: HOME | End: 2018-11-08
Payer: COMMERCIAL

## 2018-11-06 DIAGNOSIS — Z12.31 SCREENING MAMMOGRAM, ENCOUNTER FOR: ICD-10-CM

## 2018-11-06 PROCEDURE — 77067 SCR MAMMO BI INCL CAD: CPT

## 2018-11-07 ENCOUNTER — TELEPHONE (OUTPATIENT)
Dept: OBGYN CLINIC | Age: 53
End: 2018-11-07

## 2018-11-07 DIAGNOSIS — R92.8 ABNORMAL MAMMOGRAM: Primary | ICD-10-CM

## 2018-11-13 ENCOUNTER — HOSPITAL ENCOUNTER (OUTPATIENT)
Dept: ULTRASOUND IMAGING | Age: 53
Discharge: HOME | End: 2018-11-15
Payer: COMMERCIAL

## 2018-11-13 ENCOUNTER — HOSPITAL ENCOUNTER (OUTPATIENT)
Dept: WOMENS IMAGING | Age: 53
Discharge: HOME | End: 2018-11-15
Payer: COMMERCIAL

## 2018-11-13 DIAGNOSIS — R92.8 ABNORMAL MAMMOGRAM: ICD-10-CM

## 2018-11-13 PROCEDURE — 76642 ULTRASOUND BREAST LIMITED: CPT

## 2018-11-13 PROCEDURE — G0279 TOMOSYNTHESIS, MAMMO: HCPCS

## 2018-11-14 DIAGNOSIS — R92.8 ABNORMAL FINDINGS ON DIAGNOSTIC IMAGING OF BREAST: Primary | ICD-10-CM

## 2018-11-19 ENCOUNTER — TELEPHONE (OUTPATIENT)
Dept: OBGYN CLINIC | Age: 53
End: 2018-11-19

## 2018-11-19 ENCOUNTER — OFFICE VISIT (OUTPATIENT)
Dept: SURGERY | Age: 53
End: 2018-11-19
Payer: COMMERCIAL

## 2018-11-19 VITALS
WEIGHT: 187 LBS | DIASTOLIC BLOOD PRESSURE: 78 MMHG | BODY MASS INDEX: 33.13 KG/M2 | SYSTOLIC BLOOD PRESSURE: 122 MMHG | HEIGHT: 63 IN | TEMPERATURE: 97.7 F

## 2018-11-19 DIAGNOSIS — R92.8 ABNORMAL ULTRASOUND OF BREAST: ICD-10-CM

## 2018-11-19 DIAGNOSIS — I72.9 ANEURYSM (HCC): Primary | ICD-10-CM

## 2018-11-19 PROCEDURE — 99202 OFFICE O/P NEW SF 15 MIN: CPT | Performed by: SURGERY

## 2018-12-12 ENCOUNTER — HOSPITAL ENCOUNTER (OUTPATIENT)
Dept: ULTRASOUND IMAGING | Age: 53
Discharge: HOME | End: 2018-12-14
Payer: COMMERCIAL

## 2018-12-12 DIAGNOSIS — I72.9 ANEURYSM (HCC): ICD-10-CM

## 2018-12-12 PROCEDURE — 76706 US ABDL AORTA SCREEN AAA: CPT

## 2018-12-21 ENCOUNTER — TELEPHONE (OUTPATIENT)
Dept: SURGERY | Age: 53
End: 2018-12-21

## 2019-04-15 ENCOUNTER — OFFICE VISIT (OUTPATIENT)
Dept: INTERVENTIONAL RADIOLOGY/VASCULAR | Age: 54
End: 2019-04-15
Payer: COMMERCIAL

## 2019-04-15 VITALS
BODY MASS INDEX: 35.14 KG/M2 | OXYGEN SATURATION: 98 % | HEART RATE: 70 BPM | RESPIRATION RATE: 14 BRPM | WEIGHT: 198.4 LBS | DIASTOLIC BLOOD PRESSURE: 84 MMHG | SYSTOLIC BLOOD PRESSURE: 110 MMHG

## 2019-04-15 DIAGNOSIS — I78.1 ASYMPTOMATIC TELANGIECTASIA: ICD-10-CM

## 2019-04-15 DIAGNOSIS — M79.669 PAIN AND SWELLING OF LOWER LEG, UNSPECIFIED LATERALITY: Primary | ICD-10-CM

## 2019-04-15 DIAGNOSIS — M79.89 PAIN AND SWELLING OF LOWER LEG, UNSPECIFIED LATERALITY: Primary | ICD-10-CM

## 2019-04-15 DIAGNOSIS — I87.2 VENOUS INSUFFICIENCY OF LEFT LOWER EXTREMITY: ICD-10-CM

## 2019-04-15 PROCEDURE — 99244 OFF/OP CNSLTJ NEW/EST MOD 40: CPT | Performed by: NURSE PRACTITIONER

## 2019-04-15 ASSESSMENT — ENCOUNTER SYMPTOMS
BACK PAIN: 0
DIARRHEA: 0
NAUSEA: 0
EYE REDNESS: 0
EYE PAIN: 0
SINUS PRESSURE: 0
COUGH: 0
SHORTNESS OF BREATH: 0
TROUBLE SWALLOWING: 0
VOMITING: 0
EYE DISCHARGE: 0
SORE THROAT: 0
SINUS PAIN: 0
ABDOMINAL DISTENTION: 0
ABDOMINAL PAIN: 0
EYE ITCHING: 0
WHEEZING: 0

## 2019-04-15 NOTE — PROGRESS NOTES
Spouse name: None    Number of children: None    Years of education: None    Highest education level: None   Occupational History    None   Social Needs    Financial resource strain: None    Food insecurity:     Worry: None     Inability: None    Transportation needs:     Medical: None     Non-medical: None   Tobacco Use    Smoking status: Never Smoker    Smokeless tobacco: Never Used   Substance and Sexual Activity    Alcohol use: Yes     Comment: Ever Couple Months    Drug use: No    Sexual activity: Yes   Lifestyle    Physical activity:     Days per week: None     Minutes per session: None    Stress: None   Relationships    Social connections:     Talks on phone: None     Gets together: None     Attends Restorationism service: None     Active member of club or organization: None     Attends meetings of clubs or organizations: None     Relationship status: None    Intimate partner violence:     Fear of current or ex partner: None     Emotionally abused: None     Physically abused: None     Forced sexual activity: None   Other Topics Concern    None   Social History Narrative    None       Allergies   Allergen Reactions    Penicillins Hives       Current Outpatient Medications on File Prior to Visit   Medication Sig Dispense Refill    aspirin 81 MG tablet Take 81 mg by mouth daily      Multiple Vitamins-Minerals (MULTIVITAMIN ADULT PO) Take by mouth      Cholecalciferol (VITAMIN D PO) Take by mouth      Flaxseed, Linseed, (FLAXSEED OIL PO) Take by mouth       No current facility-administered medications on file prior to visit. Review of Systems   Constitutional: Negative for activity change, appetite change, chills, fatigue and fever. HENT: Negative for congestion, ear pain, nosebleeds, sinus pressure, sinus pain, sore throat and trouble swallowing. Eyes: Negative for pain, discharge, redness and itching. Respiratory: Negative for cough, shortness of breath and wheezing. Cardiovascular: Negative for chest pain, palpitations and leg swelling. Gastrointestinal: Negative for abdominal distention, abdominal pain, diarrhea, nausea and vomiting. Endocrine: Negative for cold intolerance and heat intolerance. Genitourinary: Negative for dysuria, frequency, hematuria and urgency. Musculoskeletal: Negative for arthralgias, back pain, neck pain and neck stiffness. Skin: Negative for rash and wound. Neurological: Negative for dizziness, seizures, weakness, light-headedness and headaches. Hematological: Negative for adenopathy. Bruises/bleeds easily. Psychiatric/Behavioral: The patient is not nervous/anxious. OBJECTIVE:  /84   Pulse 70   Resp 14   Wt 198 lb 6.4 oz (90 kg)   SpO2 98%   BMI 35.14 kg/m²      Physical Exam   Constitutional: She is oriented to person, place, and time. She appears well-developed and well-nourished. No distress. HENT:   Head: Normocephalic and atraumatic. Right Ear: External ear normal.   Left Ear: External ear normal.   Mouth/Throat: Oropharynx is clear and moist. No oropharyngeal exudate. Eyes: Pupils are equal, round, and reactive to light. Conjunctivae are normal. Right eye exhibits no discharge. Left eye exhibits no discharge. No scleral icterus. Neck: Normal range of motion. Neck supple. No JVD present. No tracheal deviation present. No thyromegaly present. Cardiovascular: Normal rate, regular rhythm, normal heart sounds and intact distal pulses. Exam reveals no gallop and no friction rub. No murmur heard. Pulses:       Popliteal pulses are 2+ on the right side, and 2+ on the left side. Dorsalis pedis pulses are 2+ on the right side, and 2+ on the left side. Posterior tibial pulses are 2+ on the right side, and 2+ on the left side. Pulmonary/Chest: Effort normal and breath sounds normal. No stridor. No respiratory distress. She has no wheezes. She has no rales. She exhibits no tenderness.    Abdominal: Soft. Bowel sounds are normal. She exhibits no distension and no mass. There is no tenderness. There is no rebound and no guarding. Musculoskeletal: She exhibits tenderness (with palpation to LE's. ). She exhibits no edema or deformity. Neurological: She is alert and oriented to person, place, and time. No cranial nerve deficit. Coordination normal.   Skin: Skin is warm and dry. No rash noted. She is not diaphoretic. No erythema. No pallor. Psychiatric: She has a normal mood and affect. Her behavior is normal. Judgment and thought content normal.       ASSESSMENT AND PLAN:    Assessment:   1. US in office demonstrates Venous Insufficiency to left GSV at level of mid thigh and proximal calf causing chronic aching for several years and multiple telangectasia that are tender to touch. Her legs are tender to touch. Not relieved by compression therapy of prescription strength. 2. Varicose Veins: Telangectasia to biltateral legs causing some tenderness to touch to Left leg. There are VV on RLE that she does not like appearance and would consider cosmetic surgery to them as this would be OOP cost to her. Plan:   1. Submit to insurance for Left GSV RFA and distal sclerotherapy. GSV is above sheath at level of mid thigh. Submit for LLE VV sclerotherapy. 2. VV cosmetic therapy to RLE. Procedure with risks instructed. She wishes to obtain information regarding OOP cost for cosmetic VV therapy and if insurance approval and then will call back office if she wishes to proceed. 3. RX given for thigh high compression to get fitted if she wishes to proceed with procedures. It was instructed to her that compression will help to slow progression of VV.      Aydee Perez, APRN - CNP

## 2019-05-14 ENCOUNTER — PROCEDURE VISIT (OUTPATIENT)
Dept: INTERVENTIONAL RADIOLOGY/VASCULAR | Age: 54
End: 2019-05-14
Payer: COMMERCIAL

## 2019-05-14 VITALS
DIASTOLIC BLOOD PRESSURE: 82 MMHG | BODY MASS INDEX: 35.07 KG/M2 | HEART RATE: 64 BPM | WEIGHT: 198 LBS | OXYGEN SATURATION: 98 % | SYSTOLIC BLOOD PRESSURE: 160 MMHG

## 2019-05-14 DIAGNOSIS — I78.1 ASYMPTOMATIC TELANGIECTASIA: Primary | ICD-10-CM

## 2019-05-14 PROCEDURE — 36471 NJX SCLRSNT MLT INCMPTNT VN: CPT | Performed by: RADIOLOGY

## 2019-05-14 NOTE — PATIENT INSTRUCTIONS
[address]  PHONE:  [phone]                                                              POST PROCEDURE INSTRUCTIONS                   1.    Wear compression stockings around the clock for 2 days. 2.    Sponge bath only. DO NOT shower for those 2 days. 3.    Do not drive for 24 hours after the procedure. 4.    You may remove the compression stocking after 2 days. 5.    You may shower after 2 days. 6.    Wear your compression stockings daily while awake. Put them on in the morning upon                           awakening, the legs are usually less swollen and they are easier to put on.              7.   Remove the compression stocking before bed. Hand wash and hang dry overnight as needed. 8.   Keep legs elevated above the heart level as often as possible. 9.   No jacuzzi's, hot tube, or bath for 2 weeks. 10.  No exercise or strenuous activity for 2 weeks. 11.  Do not lift anything greater than 5 pounds (one gallon of milk) for 2 weeks. 12   No sun exposure to the legs for one month. Wear pants or long skirts. No tanning beds. 13.  Use over the counter ibuprofen (Motrin) 400mg (2 tablets) 3 times a day as needed for pain. 14.  Follow up with Dr. Gila Piedra in 2 weeks as scheduled.   Patient Education

## 2019-09-10 ENCOUNTER — OFFICE VISIT (OUTPATIENT)
Dept: FAMILY MEDICINE CLINIC | Age: 54
End: 2019-09-10
Payer: COMMERCIAL

## 2019-09-10 VITALS
HEART RATE: 60 BPM | DIASTOLIC BLOOD PRESSURE: 88 MMHG | SYSTOLIC BLOOD PRESSURE: 164 MMHG | BODY MASS INDEX: 34.69 KG/M2 | TEMPERATURE: 97.9 F | WEIGHT: 195.8 LBS | OXYGEN SATURATION: 97 % | HEIGHT: 63 IN

## 2019-09-10 DIAGNOSIS — R03.0 ELEVATED BLOOD PRESSURE READING: ICD-10-CM

## 2019-09-10 DIAGNOSIS — R53.83 OTHER FATIGUE: ICD-10-CM

## 2019-09-10 DIAGNOSIS — R03.0 ELEVATED BLOOD PRESSURE READING: Primary | ICD-10-CM

## 2019-09-10 LAB
ALBUMIN SERPL-MCNC: 3.9 G/DL (ref 3.5–4.6)
ALP BLD-CCNC: 157 U/L (ref 40–130)
ALT SERPL-CCNC: 16 U/L (ref 0–33)
ANION GAP SERPL CALCULATED.3IONS-SCNC: 13 MEQ/L (ref 9–15)
AST SERPL-CCNC: 21 U/L (ref 0–35)
BASOPHILS ABSOLUTE: 0.1 K/UL (ref 0–0.2)
BASOPHILS RELATIVE PERCENT: 1.3 %
BILIRUB SERPL-MCNC: 0.3 MG/DL (ref 0.2–0.7)
BUN BLDV-MCNC: 9 MG/DL (ref 6–20)
CALCIUM SERPL-MCNC: 9.2 MG/DL (ref 8.5–9.9)
CHLORIDE BLD-SCNC: 103 MEQ/L (ref 95–107)
CO2: 26 MEQ/L (ref 20–31)
CREAT SERPL-MCNC: 0.75 MG/DL (ref 0.5–0.9)
EOSINOPHILS ABSOLUTE: 0.1 K/UL (ref 0–0.7)
EOSINOPHILS RELATIVE PERCENT: 1.4 %
GFR AFRICAN AMERICAN: >60
GFR NON-AFRICAN AMERICAN: >60
GLOBULIN: 4 G/DL (ref 2.3–3.5)
GLUCOSE BLD-MCNC: 102 MG/DL (ref 70–99)
HBA1C MFR BLD: 5.9 % (ref 4.8–5.9)
HCT VFR BLD CALC: 41 % (ref 37–47)
HEMOGLOBIN: 13.1 G/DL (ref 12–16)
IRON SATURATION: 13 % (ref 11–46)
IRON: 42 UG/DL (ref 37–145)
LYMPHOCYTES ABSOLUTE: 2.3 K/UL (ref 1–4.8)
LYMPHOCYTES RELATIVE PERCENT: 29.4 %
MCH RBC QN AUTO: 28.9 PG (ref 27–31.3)
MCHC RBC AUTO-ENTMCNC: 32.1 % (ref 33–37)
MCV RBC AUTO: 90.1 FL (ref 82–100)
MONOCYTES ABSOLUTE: 0.6 K/UL (ref 0.2–0.8)
MONOCYTES RELATIVE PERCENT: 8.2 %
NEUTROPHILS ABSOLUTE: 4.6 K/UL (ref 1.4–6.5)
NEUTROPHILS RELATIVE PERCENT: 59.7 %
PDW BLD-RTO: 14.6 % (ref 11.5–14.5)
PLATELET # BLD: 322 K/UL (ref 130–400)
POTASSIUM SERPL-SCNC: 4 MEQ/L (ref 3.4–4.9)
RBC # BLD: 4.55 M/UL (ref 4.2–5.4)
SODIUM BLD-SCNC: 142 MEQ/L (ref 135–144)
TOTAL IRON BINDING CAPACITY: 321 UG/DL (ref 178–450)
TOTAL PROTEIN: 7.9 G/DL (ref 6.3–8)
TSH SERPL DL<=0.05 MIU/L-ACNC: 1.78 UIU/ML (ref 0.44–3.86)
WBC # BLD: 7.7 K/UL (ref 4.8–10.8)

## 2019-09-10 PROCEDURE — 99214 OFFICE O/P EST MOD 30 MIN: CPT | Performed by: NURSE PRACTITIONER

## 2019-09-10 ASSESSMENT — ENCOUNTER SYMPTOMS
COUGH: 0
SHORTNESS OF BREATH: 0

## 2019-09-10 ASSESSMENT — PATIENT HEALTH QUESTIONNAIRE - PHQ9
1. LITTLE INTEREST OR PLEASURE IN DOING THINGS: 0
SUM OF ALL RESPONSES TO PHQ QUESTIONS 1-9: 0
SUM OF ALL RESPONSES TO PHQ QUESTIONS 1-9: 0
SUM OF ALL RESPONSES TO PHQ9 QUESTIONS 1 & 2: 0
2. FEELING DOWN, DEPRESSED OR HOPELESS: 0

## 2019-09-10 NOTE — PROGRESS NOTES
Subjective  Chief Complaint   Patient presents with    Tinnitus     pt states that when she wakes up she feels a pulse/ringing in her right ear. states that it started about a week ago.  Hypertension     pt states that her bp has been elivated and that her pulse has been on the low side. bp was elivated today.  Health Maintenance     declined flu vaccine        HPI     Pt here because she has some ringing in her ears lately. BP has been elevated as well. Admits to being fatigued. .   Also doesn't drink many fluids. No other current symptoms. Denies chest pain or shortness of breath. Denies cold like symptoms.       Patient Active Problem List    Diagnosis Date Noted    Abnormal ultrasound of breast 2018     Priority: Medium    Venous insufficiency of left lower extremity 04/15/2019    Asymptomatic telangiectasia 04/15/2019    Family history of anemia 11/15/2013    Vertigo 11/15/2013    Microcytic anemia 11/15/2013    Vitamin D deficiency 11/15/2013    Hypercoagulopathy ([address]      Past Medical History:   Diagnosis Date    Hypercoagulopathy ([address]     Microcytic anemia 11/15/2013    Vertigo 11/15/2013    Vitamin D deficiency 11/15/2013     Past Surgical History:   Procedure Laterality Date     SECTION      X2    [facility]    EYE SURGERY Bilateral     lasik    [facility]COPY  2016    [facility]      POL[facility]OMY  2016    [facility]    [place] COLON CA SCRN NOT  [address] [facility] N/A 2017    COLONOSCOPY performed by Keyon [facility]cc[facility]ack [facility] at [facility]       Family History   Problem Relation Age of Onset    Allergies Mother     Anemia Mother     Diabetes Father     Cancer Other     High Blood Pressure Other      Social History     Socioeconomic History    Marital status:      Spouse name: None    Number of children: None    Years of education: None

## 2019-09-12 ENCOUNTER — TELEPHONE (OUTPATIENT)
Dept: FAMILY MEDICINE CLINIC | Age: 54
End: 2019-09-12

## 2019-09-12 RX ORDER — LISINOPRIL 10 MG/1
10 TABLET ORAL DAILY
Qty: 30 TABLET | Refills: 5 | Status: SHIPPED | OUTPATIENT
Start: 2019-09-12 | End: 2019-12-23 | Stop reason: SINTOL

## 2019-09-18 ENCOUNTER — OFFICE VISIT (OUTPATIENT)
Dept: FAMILY MEDICINE CLINIC | Age: 54
End: 2019-09-18
Payer: COMMERCIAL

## 2019-09-18 VITALS
HEIGHT: 63 IN | WEIGHT: 195.4 LBS | OXYGEN SATURATION: 97 % | TEMPERATURE: 97.8 F | HEART RATE: 60 BPM | BODY MASS INDEX: 34.62 KG/M2 | SYSTOLIC BLOOD PRESSURE: 132 MMHG | DIASTOLIC BLOOD PRESSURE: 86 MMHG

## 2019-09-18 DIAGNOSIS — Z82.41 FAMILY HISTORY OF SUDDEN CARDIAC DEATH: ICD-10-CM

## 2019-09-18 DIAGNOSIS — I10 ESSENTIAL HYPERTENSION: Primary | ICD-10-CM

## 2019-09-18 PROCEDURE — 99213 OFFICE O/P EST LOW 20 MIN: CPT | Performed by: NURSE PRACTITIONER

## 2019-09-18 ASSESSMENT — ENCOUNTER SYMPTOMS
COUGH: 0
NAUSEA: 0
DIARRHEA: 0
SHORTNESS OF BREATH: 0

## 2019-09-18 NOTE — PROGRESS NOTES
None    Transportation needs:     Medical: None     Non-medical: None   Tobacco Use    Smoking status: Never Smoker    Smokeless tobacco: Never Used   Substance and Sexual Activity    Alcohol use: Yes     Comment: Ever Couple Months    Drug use: No    Sexual activity: Yes   Lifestyle    Physical activity:     Days per week: None     Minutes per session: None    Stress: None   Relationships    Social connections:     Talks on phone: None     Gets together: None     Attends Mandaeism service: None     Active member of club or organization: None     Attends meetings of clubs or organizations: None     Relationship status: None    Intimate partner violence:     Fear of current or ex partner: None     Emotionally abused: None     Physically abused: None     Forced sexual activity: None   Other Topics Concern    None   Social History Narrative    None     Current Outpatient Medications on File Prior to Visit   Medication Sig Dispense Refill    lisinopril (PRINIVIL;ZESTRIL) 10 MG tablet Take 1 tablet by mouth daily 30 tablet 5     No current facility-administered medications on file prior to visit. Allergies   Allergen Reactions    Penicillins Hives       Review of Systems   Constitutional: Negative for fatigue. Respiratory: Negative for cough and shortness of breath. Cardiovascular: Negative for chest pain and leg swelling. Gastrointestinal: Negative for diarrhea and nausea. Objective  Vitals:    09/18/19 0800   BP: 132/86   Pulse: 60   Temp: 97.8 °F (36.6 °C)   SpO2: 97%   Weight: 195 lb 6.4 oz (88.6 kg)   Height: 5' 3\" (1.6 m)     Physical Exam   Constitutional: She is oriented to person, place, and time. She appears well-developed and well-nourished. HENT:   Head: Normocephalic. Cardiovascular: Normal rate, regular rhythm, normal heart sounds and intact distal pulses.    Pulmonary/Chest: Effort normal and breath sounds normal.   Neurological: She is alert and oriented to person,

## 2019-12-23 ENCOUNTER — OFFICE VISIT (OUTPATIENT)
Dept: FAMILY MEDICINE CLINIC | Age: 54
End: 2019-12-23
Payer: COMMERCIAL

## 2019-12-23 VITALS
HEIGHT: 63 IN | DIASTOLIC BLOOD PRESSURE: 80 MMHG | SYSTOLIC BLOOD PRESSURE: 128 MMHG | OXYGEN SATURATION: 98 % | BODY MASS INDEX: 34.66 KG/M2 | HEART RATE: 62 BPM | WEIGHT: 195.6 LBS

## 2019-12-23 DIAGNOSIS — N30.00 ACUTE CYSTITIS WITHOUT HEMATURIA: ICD-10-CM

## 2019-12-23 DIAGNOSIS — N30.00 ACUTE CYSTITIS WITHOUT HEMATURIA: Primary | ICD-10-CM

## 2019-12-23 LAB
BILIRUBIN, POC: NORMAL
BLOOD URINE, POC: NORMAL
CLARITY, POC: NORMAL
COLOR, POC: NORMAL
GLUCOSE URINE, POC: NORMAL
KETONES, POC: NORMAL
LEUKOCYTE EST, POC: NORMAL
NITRITE, POC: NORMAL
PH, POC: 6
PROTEIN, POC: NORMAL
SPECIFIC GRAVITY, POC: 1.03
UROBILINOGEN, POC: 0.2

## 2019-12-23 PROCEDURE — 99213 OFFICE O/P EST LOW 20 MIN: CPT | Performed by: NURSE PRACTITIONER

## 2019-12-23 PROCEDURE — 81002 URINALYSIS NONAUTO W/O SCOPE: CPT | Performed by: NURSE PRACTITIONER

## 2019-12-23 RX ORDER — SULFAMETHOXAZOLE AND TRIMETHOPRIM 800; 160 MG/1; MG/1
1 TABLET ORAL 2 TIMES DAILY
Qty: 14 TABLET | Refills: 0 | Status: SHIPPED | OUTPATIENT
Start: 2019-12-23 | End: 2019-12-30

## 2019-12-23 RX ORDER — PHENAZOPYRIDINE HYDROCHLORIDE 200 MG/1
200 TABLET, FILM COATED ORAL 3 TIMES DAILY PRN
Qty: 9 TABLET | Refills: 0 | Status: SHIPPED | OUTPATIENT
Start: 2019-12-23 | End: 2019-12-26

## 2019-12-23 SDOH — ECONOMIC STABILITY: FOOD INSECURITY: WITHIN THE PAST 12 MONTHS, YOU WORRIED THAT YOUR FOOD WOULD RUN OUT BEFORE YOU GOT MONEY TO BUY MORE.: NEVER TRUE

## 2019-12-23 SDOH — ECONOMIC STABILITY: TRANSPORTATION INSECURITY
IN THE PAST 12 MONTHS, HAS LACK OF TRANSPORTATION KEPT YOU FROM MEETINGS, WORK, OR FROM GETTING THINGS NEEDED FOR DAILY LIVING?: NO

## 2019-12-23 SDOH — ECONOMIC STABILITY: TRANSPORTATION INSECURITY
IN THE PAST 12 MONTHS, HAS THE LACK OF TRANSPORTATION KEPT YOU FROM MEDICAL APPOINTMENTS OR FROM GETTING MEDICATIONS?: NO

## 2019-12-23 SDOH — ECONOMIC STABILITY: INCOME INSECURITY: HOW HARD IS IT FOR YOU TO PAY FOR THE VERY BASICS LIKE FOOD, HOUSING, MEDICAL CARE, AND HEATING?: NOT HARD AT ALL

## 2019-12-23 SDOH — ECONOMIC STABILITY: FOOD INSECURITY: WITHIN THE PAST 12 MONTHS, THE FOOD YOU BOUGHT JUST DIDN'T LAST AND YOU DIDN'T HAVE MONEY TO GET MORE.: NEVER TRUE

## 2019-12-26 LAB
ORGANISM: ABNORMAL
URINE CULTURE, ROUTINE: ABNORMAL

## 2020-10-06 ENCOUNTER — OFFICE VISIT (OUTPATIENT)
Dept: FAMILY MEDICINE CLINIC | Age: 55
End: 2020-10-06
Payer: COMMERCIAL

## 2020-10-06 VITALS
OXYGEN SATURATION: 97 % | BODY MASS INDEX: 35.51 KG/M2 | HEART RATE: 78 BPM | TEMPERATURE: 97.1 F | SYSTOLIC BLOOD PRESSURE: 124 MMHG | DIASTOLIC BLOOD PRESSURE: 80 MMHG | WEIGHT: 200.4 LBS | HEIGHT: 63 IN

## 2020-10-06 DIAGNOSIS — Z13.220 LIPID SCREENING: ICD-10-CM

## 2020-10-06 DIAGNOSIS — I10 ESSENTIAL HYPERTENSION: ICD-10-CM

## 2020-10-06 DIAGNOSIS — Z11.59 NEED FOR HEPATITIS C SCREENING TEST: ICD-10-CM

## 2020-10-06 DIAGNOSIS — R53.83 FATIGUE, UNSPECIFIED TYPE: ICD-10-CM

## 2020-10-06 DIAGNOSIS — R73.03 BORDERLINE DIABETES: ICD-10-CM

## 2020-10-06 DIAGNOSIS — Z11.4 ENCOUNTER FOR SCREENING FOR HIV: ICD-10-CM

## 2020-10-06 DIAGNOSIS — D50.9 IRON DEFICIENCY ANEMIA, UNSPECIFIED IRON DEFICIENCY ANEMIA TYPE: ICD-10-CM

## 2020-10-06 DIAGNOSIS — E55.9 VITAMIN D DEFICIENCY: ICD-10-CM

## 2020-10-06 LAB
ALBUMIN SERPL-MCNC: 4.1 G/DL (ref 3.5–4.6)
ALP BLD-CCNC: 164 U/L (ref 40–130)
ALT SERPL-CCNC: 13 U/L (ref 0–33)
ANION GAP SERPL CALCULATED.3IONS-SCNC: 12 MEQ/L (ref 9–15)
AST SERPL-CCNC: 23 U/L (ref 0–35)
BASOPHILS ABSOLUTE: 0.1 K/UL (ref 0–0.2)
BASOPHILS RELATIVE PERCENT: 1 %
BILIRUB SERPL-MCNC: 0.3 MG/DL (ref 0.2–0.7)
BUN BLDV-MCNC: 10 MG/DL (ref 6–20)
CALCIUM SERPL-MCNC: 9.6 MG/DL (ref 8.5–9.9)
CHLORIDE BLD-SCNC: 103 MEQ/L (ref 95–107)
CHOLESTEROL, TOTAL: 198 MG/DL (ref 0–199)
CO2: 25 MEQ/L (ref 20–31)
CREAT SERPL-MCNC: 0.82 MG/DL (ref 0.5–0.9)
EOSINOPHILS ABSOLUTE: 0.1 K/UL (ref 0–0.7)
EOSINOPHILS RELATIVE PERCENT: 1.1 %
GFR AFRICAN AMERICAN: >60
GFR NON-AFRICAN AMERICAN: >60
GLOBULIN: 3.8 G/DL (ref 2.3–3.5)
GLUCOSE BLD-MCNC: 93 MG/DL (ref 70–99)
HBA1C MFR BLD: 6.2 % (ref 4.8–5.9)
HCT VFR BLD CALC: 40.3 % (ref 37–47)
HDLC SERPL-MCNC: 46 MG/DL (ref 40–59)
HEMOGLOBIN: 12.9 G/DL (ref 12–16)
HEPATITIS C ANTIBODY INTERPRETATION: NORMAL
IRON SATURATION: 14 % (ref 11–46)
IRON: 50 UG/DL (ref 37–145)
LDL CHOLESTEROL CALCULATED: 135 MG/DL (ref 0–129)
LYMPHOCYTES ABSOLUTE: 2.5 K/UL (ref 1–4.8)
LYMPHOCYTES RELATIVE PERCENT: 29.8 %
MCH RBC QN AUTO: 28.2 PG (ref 27–31.3)
MCHC RBC AUTO-ENTMCNC: 32 % (ref 33–37)
MCV RBC AUTO: 88 FL (ref 82–100)
MONOCYTES ABSOLUTE: 0.5 K/UL (ref 0.2–0.8)
MONOCYTES RELATIVE PERCENT: 5.7 %
NEUTROPHILS ABSOLUTE: 5.2 K/UL (ref 1.4–6.5)
NEUTROPHILS RELATIVE PERCENT: 62.4 %
PDW BLD-RTO: 14.1 % (ref 11.5–14.5)
PLATELET # BLD: 324 K/UL (ref 130–400)
POTASSIUM SERPL-SCNC: 4.6 MEQ/L (ref 3.4–4.9)
RBC # BLD: 4.58 M/UL (ref 4.2–5.4)
SODIUM BLD-SCNC: 140 MEQ/L (ref 135–144)
TOTAL IRON BINDING CAPACITY: 353 UG/DL (ref 178–450)
TOTAL PROTEIN: 7.9 G/DL (ref 6.3–8)
TRIGL SERPL-MCNC: 86 MG/DL (ref 0–150)
TSH REFLEX: 2.29 UIU/ML (ref 0.44–3.86)
VITAMIN D 25-HYDROXY: 35.8 NG/ML (ref 30–100)
WBC # BLD: 8.2 K/UL (ref 4.8–10.8)

## 2020-10-06 PROCEDURE — 99214 OFFICE O/P EST MOD 30 MIN: CPT | Performed by: NURSE PRACTITIONER

## 2020-10-06 PROCEDURE — G0444 DEPRESSION SCREEN ANNUAL: HCPCS | Performed by: NURSE PRACTITIONER

## 2020-10-06 PROCEDURE — G8431 POS CLIN DEPRES SCRN F/U DOC: HCPCS | Performed by: NURSE PRACTITIONER

## 2020-10-06 RX ORDER — OLMESARTAN MEDOXOMIL 5 MG/1
5 TABLET ORAL DAILY
Qty: 30 TABLET | Refills: 11 | Status: SHIPPED | OUTPATIENT
Start: 2020-10-06 | End: 2021-10-25 | Stop reason: SDUPTHER

## 2020-10-06 RX ORDER — PHENTERMINE HYDROCHLORIDE 37.5 MG/1
37.5 TABLET ORAL
Qty: 30 TABLET | Refills: 0 | Status: SHIPPED | OUTPATIENT
Start: 2020-10-06 | End: 2020-10-09

## 2020-10-06 RX ORDER — CLINDAMYCIN HYDROCHLORIDE 150 MG/1
CAPSULE ORAL
COMMUNITY
Start: 2020-10-05 | End: 2020-10-23 | Stop reason: ALTCHOICE

## 2020-10-06 ASSESSMENT — PATIENT HEALTH QUESTIONNAIRE - PHQ9
3. TROUBLE FALLING OR STAYING ASLEEP: 0
SUM OF ALL RESPONSES TO PHQ QUESTIONS 1-9: 8
5. POOR APPETITE OR OVEREATING: 2
9. THOUGHTS THAT YOU WOULD BE BETTER OFF DEAD, OR OF HURTING YOURSELF: 0
SUM OF ALL RESPONSES TO PHQ QUESTIONS 1-9: 8
4. FEELING TIRED OR HAVING LITTLE ENERGY: 2
SUM OF ALL RESPONSES TO PHQ9 QUESTIONS 1 & 2: 4
6. FEELING BAD ABOUT YOURSELF - OR THAT YOU ARE A FAILURE OR HAVE LET YOURSELF OR YOUR FAMILY DOWN: 0
8. MOVING OR SPEAKING SO SLOWLY THAT OTHER PEOPLE COULD HAVE NOTICED. OR THE OPPOSITE, BEING SO FIGETY OR RESTLESS THAT YOU HAVE BEEN MOVING AROUND A LOT MORE THAN USUAL: 0
7. TROUBLE CONCENTRATING ON THINGS, SUCH AS READING THE NEWSPAPER OR WATCHING TELEVISION: 0
1. LITTLE INTEREST OR PLEASURE IN DOING THINGS: 2
10. IF YOU CHECKED OFF ANY PROBLEMS, HOW DIFFICULT HAVE THESE PROBLEMS MADE IT FOR YOU TO DO YOUR WORK, TAKE CARE OF THINGS AT HOME, OR GET ALONG WITH OTHER PEOPLE: 1
2. FEELING DOWN, DEPRESSED OR HOPELESS: 2

## 2020-10-06 ASSESSMENT — ENCOUNTER SYMPTOMS
SHORTNESS OF BREATH: 0
COUGH: 0
CONSTIPATION: 0
DIARRHEA: 0

## 2020-10-06 NOTE — PROGRESS NOTES
Subjective  Chief Complaint   Patient presents with    Annual Exam    Depression     advise screening       Hypertension   This is a chronic problem. The current episode started more than 1 year ago. The problem is unchanged. The problem is controlled. Pertinent negatives include no chest pain or shortness of breath. (None) There are no associated agents to hypertension. Risk factors for coronary artery disease include obesity and sedentary lifestyle. Past treatments include angiotensin blockers. Compliance problems include diet and exercise. Pt also admits that she has been \"down\" lately. Frannie Justice into a rut with diet and exercise. Wondering if there is something we can do to kick start weight loss and energy.      Patient Active Problem List    Diagnosis Date Noted    Abnormal ultrasound of breast 2018     Priority: Medium    Venous insufficiency of left lower extremity 04/15/2019    Asymptomatic telangiectasia 04/15/2019    Family history of anemia 11/15/2013    Vertigo 11/15/2013    Microcytic anemia 11/15/2013    Vitamin D deficiency 11/15/2013    Hypercoagulopathy ([address]      Past Medical History:   Diagnosis Date    Hypercoagulopathy ([address]     Microcytic anemia 11/15/2013    Vertigo 11/15/2013    Vitamin D deficiency 11/15/2013     Past Surgical History:   Procedure Laterality Date     SECTION      X2    [facility]    EYE SURGERY Bilateral     lasik    HYSTEROSCOPY  2016    [facility] INCONTINENCE SURGERY      POL[facility]CTOMY  2016    [facility]    [place] COLON CA SCRN NOT  [address] IND N/A 2017    COLONOSCOPY [facility]ro [facility] at [facility]       Family History   Problem Relation Age of Onset    Allergies Mother     Anemia Mother     Diabetes Father     Cancer Other     High Blood Pressure Other      Social History     Socioeconomic History    Marital status:      Spouse name: None    Number of children: None    Years of education: None    Highest education level: None   Occupational History    None   Social Needs    Financial resource strain: Not hard at all   Juanjo-Eduardo insecurity     Worry: Never true     Inability: Never true   [employer] needs     Medical: No     Non-medical: No   Tobacco Use    Smoking status: Never Smoker    Smokeless tobacco: Never Used   Substance and Sexual Activity    Alcohol use: Yes     Comment: Ever Couple Months    Drug use: No    Sexual activity: Yes   Lifestyle    Physical activity     Days per week: None     Minutes per session: None    Stress: None   Relationships    Social connections     Talks on phone: None     Gets together: None     Attends Episcopalian service: None     Active member of club or organization: None     Attends meetings of clubs or organizations: None     Relationship status: None    Intimate partner violence     Fear of current or ex partner: None     Emotionally abused: None     Physically abused: None     Forced sexual activity: None   Other Topics Concern    None   Social History Narrative    None     Current Outpatient Medications on File Prior to Visit   Medication Sig Dispense Refill    clindamycin (CLEOCIN) 150 MG capsule       olmesartan (BENICAR) 5 MG tablet TAKE 1 TABLET BY MOUTH DAILY 30 tablet 0     No current facility-administered medications on file prior to visit. Allergies   Allergen Reactions    Lisinopril Other (See Comments)     COUGH    Penicillins Hives       Review of Systems   Constitutional: Positive for fatigue. Respiratory: Negative for cough and shortness of breath. Cardiovascular: Negative for chest pain. Gastrointestinal: Negative for constipation and diarrhea. Psychiatric/Behavioral: Positive for dysphoric mood.        Objective  Vitals:    10/06/20 0734   BP: 124/80   Pulse: 78   Temp: 97.1 °F (36.2 °C)   SpO2: 97%   Weight: 200 lb 6.4 oz (90.9 kg)   Height: 5' 3\" (1.6 m)     Physical Exam  Vitals signs and nursing note reviewed. Constitutional:       Appearance: Normal appearance. She is obese. HENT:      Head: Normocephalic. Right Ear: Ear canal and external ear normal.      Left Ear: Ear canal and external ear normal.      Nose: Nose normal.      Mouth/Throat:      Mouth: Mucous membranes are moist.   Eyes:      Extraocular Movements: Extraocular movements intact. Conjunctiva/sclera: Conjunctivae normal.      Pupils: Pupils are equal, round, and reactive to light. Cardiovascular:      Rate and Rhythm: Normal rate and regular rhythm. Pulses: Normal pulses. Heart sounds: Normal heart sounds. Pulmonary:      Effort: Pulmonary effort is normal.      Breath sounds: Normal breath sounds. Skin:     General: Skin is warm. Neurological:      General: No focal deficit present. Mental Status: She is alert and oriented to person, place, and time. Mental status is at baseline. Psychiatric:         Mood and Affect: Mood normal.         Behavior: Behavior normal.         Thought Content: Thought content normal.         Judgment: Judgment normal.          Assessment & Plan     Diagnosis Orders   1. Essential hypertension  olmesartan (BENICAR) 5 MG tablet    Comprehensive Metabolic Panel   2. Class 2 severe obesity due to excess calories with serious comorbidity and body mass index (BMI) of 35.0 to 35.9 in adult (HCC)  phentermine (ADIPEX-P) 37.5 MG tablet   3. Iron deficiency anemia, unspecified iron deficiency anemia type  Iron and TIBC    CBC Auto Differential   4. Vitamin D deficiency  Vitamin D 25 Hydroxy   5. Borderline diabetes  Hemoglobin A1C   6. Fatigue, unspecified type  TSH with Reflex   7. Lipid screening  Lipid Panel   8. Need for hepatitis C screening test  Hepatitis C Antibody   9. Encounter for screening for HIV  HIV-1,2 Combo Ag/Ab By CODEY, Reflexive Panel   10.  Encounter for screening for malignant neoplasm of (BENICAR) 5 MG tablet     Sig: Take 1 tablet by mouth daily     Dispense:  30 tablet     Refill:  11    phentermine (ADIPEX-P) 37.5 MG tablet     Sig: Take 1 tablet by mouth every morning (before breakfast) for 30 days. Dispense:  30 tablet     Refill:  0     Side effects, adverse effects of the medication prescribed today, as well as treatment plan/ rationale and result expectations have been discussed with the patient who expresses understanding and desires to proceed. Close follow up to evaluate treatment results and for coordination of care. I have reviewed the patient's medical history in detail and updated the computerized patient record. As always, patient is advised that if symptoms worsen in any way they will proceed to the nearest emergency room. Fu in one month. My fitness pal and map my walk recommended. Rules for adipex given to pt to be seen every month for 3 months, to fill rx each month every 30-37 days once started. And informed insurance will likely not cover the medication. Pt verbalized an understanding. Time spent face to face was 15 minutes with more than 50% of my time spent counseling. F/u in 4 weeks. WALLACE Joiner - KARRIE    On the basis of positive PHQ-9 screening (PHQ-9 Total Score: 8), the following plan was implemented: patient declines further evaluation/treatment for depression. Patient will follow-up in 1 month(s) with PCP.

## 2020-10-08 LAB — HIV 1,2 COMBO ANTIGEN/ANTIBODY: NEGATIVE

## 2020-10-09 ENCOUNTER — OFFICE VISIT (OUTPATIENT)
Dept: OBGYN CLINIC | Age: 55
End: 2020-10-09
Payer: COMMERCIAL

## 2020-10-09 VITALS
HEIGHT: 63 IN | DIASTOLIC BLOOD PRESSURE: 68 MMHG | WEIGHT: 198 LBS | SYSTOLIC BLOOD PRESSURE: 122 MMHG | BODY MASS INDEX: 35.08 KG/M2

## 2020-10-09 PROCEDURE — 99386 PREV VISIT NEW AGE 40-64: CPT | Performed by: OBSTETRICS & GYNECOLOGY

## 2020-10-09 RX ORDER — ATORVASTATIN CALCIUM 20 MG/1
TABLET, FILM COATED ORAL
COMMUNITY
Start: 2020-10-09 | End: 2022-08-08 | Stop reason: CLARIF

## 2020-10-09 RX ORDER — ASPIRIN 81 MG/1
81 TABLET, CHEWABLE ORAL DAILY
COMMUNITY
Start: 2020-10-09

## 2020-10-09 ASSESSMENT — ENCOUNTER SYMPTOMS
NAUSEA: 0
DIARRHEA: 0
ABDOMINAL PAIN: 0
ANAL BLEEDING: 0
RECTAL PAIN: 0
EYES NEGATIVE: 1
ALLERGIC/IMMUNOLOGIC NEGATIVE: 1
VOMITING: 0
CONSTIPATION: 0
RESPIRATORY NEGATIVE: 1
ABDOMINAL DISTENTION: 0
BLOOD IN STOOL: 0

## 2020-10-09 ASSESSMENT — PATIENT HEALTH QUESTIONNAIRE - PHQ9
SUM OF ALL RESPONSES TO PHQ QUESTIONS 1-9: 0
2. FEELING DOWN, DEPRESSED OR HOPELESS: 0
SUM OF ALL RESPONSES TO PHQ QUESTIONS 1-9: 0
1. LITTLE INTEREST OR PLEASURE IN DOING THINGS: 0
SUM OF ALL RESPONSES TO PHQ9 QUESTIONS 1 & 2: 0

## 2020-10-09 ASSESSMENT — VISUAL ACUITY: OU: 1

## 2020-10-09 NOTE — PROGRESS NOTES
The patient was asked if she would like a chaperone present for her intimate exam. She  Declines the chaperone.  Tiff

## 2020-10-09 NOTE — PROGRESS NOTES
Subjective:      Patient ID: Modesto Dixon is a 63 y.o. female    Annual exam.   No PMB. IUD still in place ( placed  ). Will remove at next year's annual exam.    No GYN complaints. Pap performed and screening mammogram ordered. Monthly SBE encouraged. Screening colonoscopy recommended per routine. F/U annual exam or prn. Vitals:  Ht 5' 3\" (1.6 m)   Wt 198 lb (89.8 kg)   BMI 35.07 kg/m²   Past Medical History:   Diagnosis Date    Hypercoagulopathy ([address]     Microcytic anemia 11/15/2013    Vertigo 11/15/2013    Vitamin D deficiency 11/15/2013     Past Surgical History:   Procedure Laterality Date     SECTION      X2    [facility]    EYE SURGERY Bilateral     lasik    [facility]    INCONTINENCE SURGERY      [facility]CTOMY  2016    [facility]    [place] COLON CA SCRN NOT  [address] IND N/A 2017    COLONOSCOPY performed by Ynes Lucas MD at [facility] 21       Allergies:  Lisinopril and Penicillins  Current Outpatient Medications   Medication Sig Dispense Refill    aspirin 81 MG chewable tablet Take 81 mg by mouth daily      clindamycin (CLEOCIN) 150 MG capsule       olmesartan (BENICAR) 5 MG tablet Take 1 tablet by mouth daily 30 tablet 11    atorvastatin (LIPITOR) 20 MG tablet        No current facility-administered medications for this visit.       Social History     Socioeconomic History    Marital status:      Spouse name: Not on file    Number of children: Not on file    Years of education: Not on file    Highest education level: Not on file   Occupational History    Not on file   Social Needs    Financial resource strain: Not hard at all    Food insecurity     Worry: Never true     Inability: Never true   [employer] needs     Medical: No     Non-medical: No   Tobacco Use    Smoking status: Never Smoker    Smokeless tobacco: Never Used well-developed. HENT:      Head: Normocephalic. Eyes:      General: Lids are normal. Vision grossly intact. Neck:      Musculoskeletal: Normal range of motion and neck supple. Thyroid: No thyromegaly. Cardiovascular:      Rate and Rhythm: Normal rate and regular rhythm. Heart sounds: Normal heart sounds. Pulmonary:      Effort: Pulmonary effort is normal. No respiratory distress. Breath sounds: Normal breath sounds. No wheezing or rales. Chest:      Chest wall: No tenderness. Breasts:         Right: Normal. No swelling, bleeding, inverted nipple, mass, nipple discharge, skin change or tenderness. Left: Normal. No swelling, bleeding, inverted nipple, mass, nipple discharge, skin change or tenderness. Abdominal:      General: There is no distension. Palpations: Abdomen is soft. There is no mass. Tenderness: There is no abdominal tenderness. There is no guarding or rebound. Hernia: No hernia is present. There is no hernia in the left inguinal area or right inguinal area. Genitourinary:     General: Normal vulva. Pubic Area: No rash. Labia:         Right: No rash, tenderness, lesion or injury. Left: No rash, tenderness, lesion or injury. Urethra: No prolapse, urethral swelling or urethral lesion. Vagina: Normal. No signs of injury and foreign body. No vaginal discharge, erythema, tenderness or bleeding. Cervix: No cervical motion tenderness, discharge or friability. Uterus: Not deviated, not enlarged, not fixed and not tender. Adnexa:         Right: No mass, tenderness or fullness. Left: No mass, tenderness or fullness. Rectum: Normal.   Musculoskeletal: Normal range of motion. General: No tenderness. Lymphadenopathy:      Cervical: No cervical adenopathy. Upper Body:      Right upper body: No supraclavicular or axillary adenopathy.       Left upper body: No supraclavicular or axillary

## 2020-10-21 NOTE — LETTER
[address]  [place] [address] [facility]  Phone: [phone]  Fax: [address] [facility] [address], [place]        October 21, 2020    Kiko Emeryrowena Ellisva [address] [place] [id number]      Dear Kiko Cagle: The results of your most recent Pap smear are normal. This means that no cancerous or precancerous cells were seen. We recommend that you come back in 1 year for your next routine Pap smear. If you have any questions or concerns, please don't hesitate to call.     Sincerely,        Power Neri MD

## 2020-10-21 NOTE — LETTER
[address]  [facility] [address] [facility]  Phone: [phone]  Fax: [phone] [facility], [place]        October 21, 2020    Guero Isbell [address] [place] [id number]      Dear Guero Mccarthy: The results of your most recent Pap smear are normal. This means that no cancerous or precancerous cells were seen. We recommend that you come back in 1 year for your next routine Pap smear. If you have any questions or concerns, please don't hesitate to call.     Sincerely,        Jt Zimmerman MD

## 2020-10-22 PROBLEM — E78.00 PURE HYPERCHOLESTEROLEMIA: Status: ACTIVE | Noted: 2019-09-20

## 2020-10-22 PROBLEM — R07.89 CHEST DISCOMFORT: Status: ACTIVE | Noted: 2019-09-20

## 2020-10-22 PROBLEM — Z82.41 FAMILY HISTORY OF SUDDEN CARDIAC DEATH (SCD): Status: ACTIVE | Noted: 2019-09-20

## 2020-10-22 PROBLEM — I10 ESSENTIAL HYPERTENSION: Status: ACTIVE | Noted: 2019-09-20

## 2020-10-23 ENCOUNTER — OFFICE VISIT (OUTPATIENT)
Dept: NEUROLOGY | Age: 55
End: 2020-10-23
Payer: COMMERCIAL

## 2020-10-23 VITALS
SYSTOLIC BLOOD PRESSURE: 116 MMHG | WEIGHT: 199.2 LBS | HEART RATE: 78 BPM | BODY MASS INDEX: 35.29 KG/M2 | DIASTOLIC BLOOD PRESSURE: 78 MMHG

## 2020-10-23 DIAGNOSIS — H54.7 VISUAL LOSS: ICD-10-CM

## 2020-10-23 LAB
C-REACTIVE PROTEIN, HIGH SENSITIVITY: 11.4 MG/L (ref 0–5)
SEDIMENTATION RATE, ERYTHROCYTE: 36 MM (ref 0–30)

## 2020-10-23 PROCEDURE — 99204 OFFICE O/P NEW MOD 45 MIN: CPT | Performed by: PSYCHIATRY & NEUROLOGY

## 2020-10-23 ASSESSMENT — ENCOUNTER SYMPTOMS
NAUSEA: 0
TROUBLE SWALLOWING: 0
COLOR CHANGE: 0
VOMITING: 0
SHORTNESS OF BREATH: 0
CHOKING: 0
PHOTOPHOBIA: 0
BACK PAIN: 0

## 2020-10-23 NOTE — PROGRESS NOTES
Subjective:      Patient ID: Makenna Dan is a 63 y.o. female who presents today for:  Chief Complaint   Patient presents with    New Patient     pt was seen by eye doctor on 10/7/2020. Pt states that she was having problems in the right eye like and eclips and it would go about half way down the eye and it would last about 10 sec. She had 2 episodes and both happened while she was working on her computer. She states that since the first week in october she has not had any other episodes. HPI 79-year-old right-handed female who is here for evaluation of visual dysfunction that occurred acutely and was seen by ophthalmology on  patient was having problems with the right eye with clips which would go long term down her eye lasted for 10 seconds she had 2 episodes this occurred while she was working on the computer there was no headache. Patient was seen by ophthalmology and she now is on aspirin and Lipitor. She was not on this medication prior to this. Patient did not have a headache at that time with this. She has some occasional dizziness but no hearing loss is described. He has no other risk factors.   Vascularization seen by cardiology with a CUATE with bubble study shows normal carotid ultrasounds were reviewed from [facility] clinic which are normal.  Lipid profile shows minimally elevated LDL    Past Medical History:   Diagnosis Date    Breast disorder     right anneurysm    Essential hypertension 2019    Hypercoagulopathy ([address]     Microcytic anemia 11/15/2013    Vertigo 11/15/2013    Vitamin D deficiency 11/15/2013     Past Surgical History:   Procedure Laterality Date     SECTION      X2    [facility]    EYE SURGERY Bilateral     lasik    [facility]OSCOPY  [facility] INCONTINENCE SURGERY      [facility]    [place] COLON CA SCRN NOT  [address] IND N/A 2017    COL[facility]OSCOPY [facility] by [facility] MD at [facility] History     Socioeconomic History    Marital status:      Spouse name: Not on file    Number of children: Not on file    Years of education: Not on file    Highest education level: Not on file   Occupational History    Not on file   Social Needs    Financial resource strain: Not hard at all    Food insecurity     Worry: Never true     Inability: Never true    Transportation needs     Medical: No     Non-medical: No   Tobacco Use    Smoking status: Never Smoker    Smokeless tobacco: Never Used   Substance and Sexual Activity    Alcohol use: Yes     Comment: Ever Couple Months    Drug use: No    Sexual activity: Yes     Partners: Male   Lifestyle    Physical activity     Days per week: Not on file     Minutes per session: Not on file    Stress: Not on file   Relationships    Social connections     Talks on phone: Not on file     Gets together: Not on file     Attends Jewish service: Not on file     Active member of club or organization: Not on file     Attends meetings of clubs or organizations: Not on file     Relationship status: Not on file    Intimate partner violence     Fear of current or ex partner: Not on file     Emotionally abused: Not on file     Physically abused: Not on file     Forced sexual activity: Not on file   Other Topics Concern    Not on file   Social History Narrative    Not on file     Family History   Problem Relation Age of Onset    Allergies Mother     Anemia Mother     Diabetes Father     Cancer Other     High Blood Pressure Other     Breast Cancer Neg Hx      Allergies   Allergen Reactions    Lisinopril Other (See Comments)     COUGH    Penicillins Hives       Current Outpatient Medications   Medication Sig Dispense Refill    aspirin 81 MG chewable tablet Take 81 mg by mouth daily      atorvastatin (LIPITOR) 20 MG tablet       levonorgestrel (MIRENA, 52 MG,) IUD are normal and symmetric. Babinski sign absent on the right side. Babinski sign absent on the left side. Psychiatric:         Mood and Affect: Mood normal.         Us Screening For Aaa    Result Date: 12/13/2018  EXAMINATION: US SCREENING FOR AAA DATE AND TIME:12/12/2018 8:01 AM CLINICAL HISTORY:Reason  I72.9 Aneurysm ([address] ICD10 COMPARISON: None FINDINGS:  Biplanar images were obtained. The dimensions of the aorta and common iliac arteries are noted below. Proximal Aorta: 2.1 cm. Mid Aorta:  1.5 cm. Distal Aorta:  1.5 cm. Right Common Iliac Artery:  1.2 cm. Left Common Iliac Artery:  1.1 cm. NO EVIDENCE OF ABDOMINAL AORTIC ANEURYSM. Lab Results   Component Value Date    WBC 8.2 10/06/2020    RBC 4.58 10/06/2020    HGB 12.9 10/06/2020    HCT 40.3 10/06/2020    MCV 88.0 10/06/2020    MCH 28.2 10/06/2020    MCHC 32.0 10/06/2020    RDW 14.1 10/06/2020     10/06/2020    MPV 9.2 11/06/2013     Lab Results   Component Value Date     10/06/2020    K 4.6 10/06/2020     10/06/2020    CO2 25 10/06/2020    BUN 10 10/06/2020    CREATININE 0.82 10/06/2020    GFRAA >60.0 10/06/2020    LABGLOM >60.0 10/06/2020    GLUCOSE 93 10/06/2020    PROT 7.9 10/06/2020    LABALBU 4.1 10/06/2020    CALCIUM 9.6 10/06/2020    BILITOT 0.3 10/06/2020    ALKPHOS 164 10/06/2020    AST 23 10/06/2020    ALT 13 10/06/2020     No results found for: PROTIME, INR  Lab Results   Component Value Date    TSH 1.780 09/10/2019    [id number] 391 11/15/2013    FOLATE 14.3 11/15/2013    IRON 50 10/06/2020    TIBC 353 10/06/2020     Lab Results   Component Value Date    TRIG 86 10/06/2020    HDL 46 10/06/2020    LDLCALC 135 10/06/2020     No results found for: Emogene Pile, LABBENZ, CANNAB, COCAINESCRN, LABMETH, OPIATESCREENURINE, PHENCYCLIDINESCREENURINE, PPXUR, ETOH  No results found for: LITHIUM, DILFRTOT, VALPROATE    Assessment:       Diagnosis Orders   1.  Visual loss  Antiphospholipid Antibody Panel    Protein C Antigen, Total    Protein S Antigen, Total    Factor 5 Leiden    Anti-Dna Antibody, Double-Stranded    Antithrombin III    Sedimentation Rate    High Sensitivity Crp    MRI BRAIN WO CONTRAST    MRA HEAD WO CONTRAST    MRA NECK WO CONTRAST   2. Hypercoagulopathy ([address]     Acute visual loss of the right eye with almost 75% curtain coming down her right eye without any associated features she had 2 episodes. These are concern of ophthalmic artery pathology such as acute ischemic optic neuropathy which is resolved and improved. The other differential diagnosis ophthalmic migraine though this would have covered the whole field and she did not have any headache acephalic migraines can occur. It is a concern in this patient with this visual loss given that she does not have any risk factors for some vascular disease and therefore we have to reinvestigate her whole situation. Carotid ultrasound shows no significant stenosis she is now on aspirin and Lipitor. Patient does have some form of hyper coagulable state she had clots during pregnancy and this has not recently been evaluated. Or suspect is antiphospholipid antibody syndrome and will arrange for the titers if elevated she will require anticoagulation. Harlan MRI of the brain and MRA for fibromuscular dysplasia is recommended.   This does not appear to be a Susak  syndrome      Plan:      Orders Placed This Encounter   Procedures    MRI BRAIN WO CONTRAST     Standing Status:   Future     Standing Expiration Date:   10/23/2021     Order Specific Question:   Reason for exam:     Answer:   Visual loss    MRA HEAD WO CONTRAST     Standing Status:   Future     Standing Expiration Date:   10/23/2021     Order Specific Question:   Reason for exam:     Answer:   Fibromuscular dysplasia    MRA NECK WO CONTRAST     Standing Status:   Future     Standing Expiration Date:   10/23/2021     Order Specific Question:   Reason for exam:     Answer: Fibromuscular dysplasia    Antiphospholipid Antibody Panel     Standing Status:   Future     Standing Expiration Date:   10/23/2021    Protein C Antigen, Total     Standing Status:   Future     Standing Expiration Date:   10/23/2021    Protein S Antigen, Total     Standing Status:   Future     Standing Expiration Date:   10/23/2021    Factor 5 Leiden     Standing Status:   Future     Standing Expiration Date:   10/23/2021     Order Specific Question:   Factor V Specimen     Answer:   blood    Anti-Dna Antibody, Double-Stranded     Standing Status:   Future     Standing Expiration Date:   10/23/2021    Antithrombin III     Standing Status:   Future     Standing Expiration Date:   10/23/2021    Sedimentation Rate     Standing Status:   Future     Standing Expiration Date:   10/23/2021    High Sensitivity Crp     Standing Status:   Future     Standing Expiration Date:   10/23/2021     No orders of the defined types were placed in this encounter. No follow-ups on file.       Linh Ontiveros MD

## 2020-10-27 LAB — ANTITHROMBIN ACTIVITY: 101 % (ref 76–128)

## 2020-10-28 LAB
DOUBLE STRANDED DNA AB, IGG: NORMAL
PROTEIN C ANTIGEN: >95 % (ref 63–153)
PROTEIN S ANTIGEN, TOTAL: 112 % (ref 63–126)

## 2020-10-29 LAB — FACTOR V LEIDEN: NEGATIVE

## 2020-10-30 ENCOUNTER — HOSPITAL ENCOUNTER (OUTPATIENT)
Dept: WOMENS IMAGING | Age: 55
Discharge: HOME | End: 2020-11-01
Payer: COMMERCIAL

## 2020-10-30 PROCEDURE — 77063 BREAST TOMOSYNTHESIS BI: CPT

## 2020-11-03 ENCOUNTER — OFFICE VISIT (OUTPATIENT)
Dept: FAMILY MEDICINE CLINIC | Age: 55
End: 2020-11-03
Payer: COMMERCIAL

## 2020-11-03 VITALS
DIASTOLIC BLOOD PRESSURE: 74 MMHG | SYSTOLIC BLOOD PRESSURE: 130 MMHG | TEMPERATURE: 97.3 F | HEIGHT: 63 IN | OXYGEN SATURATION: 99 % | WEIGHT: 201.2 LBS | BODY MASS INDEX: 35.65 KG/M2 | HEART RATE: 70 BPM

## 2020-11-03 LAB
ANTIPHOSPHOLIPID AB IGG: 15 GPL (ref 0–14)
ANTIPHOSPHOLIPID AB IGM: 11 MPL (ref 0–14)

## 2020-11-03 PROCEDURE — 99213 OFFICE O/P EST LOW 20 MIN: CPT | Performed by: NURSE PRACTITIONER

## 2020-11-03 RX ORDER — PHENTERMINE HYDROCHLORIDE 37.5 MG/1
37.5 TABLET ORAL
Qty: 30 TABLET | Refills: 0 | Status: SHIPPED | OUTPATIENT
Start: 2020-11-03 | End: 2020-12-08 | Stop reason: SDUPTHER

## 2020-11-03 ASSESSMENT — ENCOUNTER SYMPTOMS
CONSTIPATION: 0
COUGH: 0
SHORTNESS OF BREATH: 0
DIARRHEA: 0

## 2020-11-03 NOTE — PROGRESS NOTES
Subjective  Chief Complaint   Patient presents with    Weight Loss     Pt would like to get refill Adipex    Mental Health Problem     Pt decline flu and informed Shingles vaccine and TDAI being over do       HPI    Pt here to follow up for multiple issues. Feeling well now. Has seen neuro and cardoiolgy. Vision changes have improved. Is due for adipex refill. Had not started taking it right away when prescribed because of the previous health concerns. However, as those concerns are resolving she decided to start the medication. No current side effects.     Patient Active Problem List    Diagnosis Date Noted    Abnormal ultrasound of breast 2018     Priority: Medium    Visual loss 10/23/2020    Chest discomfort 2019    Essential hypertension 2019    Family history of sudden cardiac death (SCD) 2019    Pure hypercholesterolemia 2019    Venous insufficiency of left lower extremity 04/15/2019    Asymptomatic telangiectasia 04/15/2019    Family history of anemia 11/15/2013    Vertigo 11/15/2013    Microcytic anemia 11/15/2013    Vitamin D deficiency 11/15/2013    Hypercoagulopathy ([address]      Past Medical History:   Diagnosis Date    Breast disorder     right anneurysm    Essential hypertension 2019    Hypercoagulopathy ([address]     Microcytic anemia 11/15/2013    Vertigo 11/15/2013    Vitamin D deficiency 11/15/2013     Past Surgical History:   Procedure Laterality Date     SECTION      X2    [facility]    EYE SURGERY Bilateral     lasik    HYSTEROSCOPY  2016    [facility]CONT[facility]GERY      POLYPECTOMY  2016    [facility]    [place] COLON [place] SCRN NOT  [address] IND N/A 2017    [facility]       Family History   Problem Relation Age of Onset    Allergies Mother     Anemia Mother     Diabetes Father     Cancer Other     High Blood Pressure Other     Breast Cancer Neg Hx      Social History     Socioeconomic History    Marital status:      Spouse name: None    Number of children: None    Years of education: None    Highest education level: None   Occupational History    None   Social Needs    Financial resource strain: Not hard at all   [employer] insecurity     Worry: Never true     Inability: Never true    Transportation needs     Medical: No     Non-medical: No   Tobacco Use    Smoking status: Never Smoker    Smokeless tobacco: Never Used   Substance and Sexual Activity    Alcohol use: Yes     Comment: Ever Couple Months    Drug use: No    Sexual activity: Yes     Partners: Male   Lifestyle    Physical activity     Days per week: None     Minutes per session: None    Stress: None   Relationships    Social connections     Talks on phone: None     Gets together: None     Attends Yarsanism service: None     Active member of club or organization: None     Attends meetings of clubs or organizations: None     Relationship status: None    Intimate partner violence     Fear of current or ex partner: None     Emotionally abused: None     Physically abused: None     Forced sexual activity: None   Other Topics Concern    None   Social History Narrative    None     Current Outpatient Medications on File Prior to Visit   Medication Sig Dispense Refill    aspirin 81 MG chewable tablet Take 81 mg by mouth daily      atorvastatin (LIPITOR) 20 MG tablet       levonorgestrel (MIRENA, 52 MG,) IUD 52 mg 1 each by Intrauterine route once      olmesartan (BENICAR) 5 MG tablet Take 1 tablet by mouth daily 30 tablet 11     No current facility-administered medications on file prior to visit. Allergies   Allergen Reactions    Lisinopril Other (See Comments)     COUGH    Penicillins Hives       Review of Systems   Constitutional: Negative for fatigue.    Respiratory: Negative for cough and shortness of breath. Cardiovascular: Negative for chest pain. Gastrointestinal: Negative for constipation and diarrhea. Objective  Vitals:    11/03/20 1504   BP: 130/74   Site: Left Upper Arm   Position: Sitting   Cuff Size: Large Adult   Pulse: 70   Temp: 97.3 °F (36.3 °C)   TempSrc: Infrared   SpO2: 99%   Weight: 201 lb 3.2 oz (91.3 kg)   Height: 5' 3\" (1.6 m)     Physical Exam  Vitals signs and nursing note reviewed. Constitutional:       Appearance: Normal appearance. She is normal weight. HENT:      Head: Normocephalic. Nose: Nose normal.      Mouth/Throat:      Mouth: Mucous membranes are moist.      Pharynx: Oropharynx is clear. Eyes:      Extraocular Movements: Extraocular movements intact. Conjunctiva/sclera: Conjunctivae normal.      Pupils: Pupils are equal, round, and reactive to light. Cardiovascular:      Rate and Rhythm: Normal rate and regular rhythm. Pulses: Normal pulses. Heart sounds: Normal heart sounds. Pulmonary:      Effort: Pulmonary effort is normal.      Breath sounds: Normal breath sounds. Skin:     General: Skin is warm. Neurological:      General: No focal deficit present. Mental Status: She is alert and oriented to person, place, and time. Mental status is at baseline. Psychiatric:         Mood and Affect: Mood normal.         Behavior: Behavior normal.         Thought Content: Thought content normal.         Judgment: Judgment normal.         Assessment & Plan     Diagnosis Orders   1. Borderline diabetes     2. Class 2 obesity due to excess calories without serious comorbidity with body mass index (BMI) of 35.0 to 35.9 in adult  phentermine (ADIPEX-P) 37.5 MG tablet        No orders of the defined types were placed in this encounter. Orders Placed This Encounter   Medications    phentermine (ADIPEX-P) 37.5 MG tablet     Sig: Take 1 tablet by mouth every morning (before breakfast) for 30 days.      Dispense:  30 tablet Refill:  0     Side effects, adverse effects of the medication prescribed today, as well as treatment plan/ rationale and result expectations have been discussed with the patient who expresses understanding and desires to proceed. Close follow up to evaluate treatment results and for coordination of care. I have reviewed the patient's medical history in detail and updated the computerized patient record. As always, patient is advised that if symptoms worsen in any way they will proceed to the nearest emergency room. My fitness pal and map my walk recommended. Rules for adipex given to pt to be seen every month for 3 months, to fill rx each month every 30-37 days once started. And informed insurance will likely not cover the medication. Pt verbalized an understanding. Time spent face to face was 15 minutes with more than 50% of my time spent counseling. F/u in 4 weeks. Fu in 1 mos.       Jony Candelario, WALLACE - CNP

## 2020-11-07 ENCOUNTER — HOSPITAL ENCOUNTER (OUTPATIENT)
Dept: MRI IMAGING | Age: 55
Discharge: HOME | End: 2020-11-09
Payer: COMMERCIAL

## 2020-11-07 ENCOUNTER — APPOINTMENT (OUTPATIENT)
Dept: MRI IMAGING | Age: 55
End: 2020-11-07
Payer: COMMERCIAL

## 2020-11-07 PROCEDURE — 70551 MRI BRAIN STEM W/O DYE: CPT

## 2020-11-07 PROCEDURE — 70547 MR ANGIOGRAPHY NECK W/O DYE: CPT

## 2020-11-11 ENCOUNTER — TELEPHONE (OUTPATIENT)
Dept: NEUROLOGY | Age: 55
End: 2020-11-11

## 2020-11-11 NOTE — TELEPHONE ENCOUNTER
Called and left message on voicemail advising.   Office number left for patient to call with questions

## 2020-11-11 NOTE — TELEPHONE ENCOUNTER
She does not need MRA head. Labs were normal. Dr Stephanie Thompson will review with her at her apt.

## 2020-12-08 ENCOUNTER — OFFICE VISIT (OUTPATIENT)
Dept: FAMILY MEDICINE CLINIC | Age: 55
End: 2020-12-08
Payer: COMMERCIAL

## 2020-12-08 VITALS
HEART RATE: 58 BPM | HEIGHT: 63 IN | SYSTOLIC BLOOD PRESSURE: 120 MMHG | DIASTOLIC BLOOD PRESSURE: 82 MMHG | TEMPERATURE: 97.2 F | WEIGHT: 192 LBS | OXYGEN SATURATION: 98 % | BODY MASS INDEX: 34.02 KG/M2

## 2020-12-08 PROCEDURE — 99213 OFFICE O/P EST LOW 20 MIN: CPT | Performed by: NURSE PRACTITIONER

## 2020-12-08 RX ORDER — MULTIVIT-MIN/IRON/FOLIC ACID/K 18-600-40
CAPSULE ORAL
COMMUNITY

## 2020-12-08 RX ORDER — PHENTERMINE HYDROCHLORIDE 37.5 MG/1
37.5 TABLET ORAL
Qty: 30 TABLET | Refills: 0 | Status: SHIPPED | OUTPATIENT
Start: 2020-12-08 | End: 2021-01-07

## 2020-12-08 ASSESSMENT — ENCOUNTER SYMPTOMS
CONSTIPATION: 0
SHORTNESS OF BREATH: 0
COUGH: 0
DIARRHEA: 0

## 2020-12-08 NOTE — PROGRESS NOTES
Subjective  Chief Complaint   Patient presents with    1 Month Follow-Up     follow up on adipex. HPI     Follow up for adipex. Has lost about 9 lbs in past month. Has been eating less/portion sizes. Overall has felt well. Some issues with sleep. No current concerns.       Patient Active Problem List    Diagnosis Date Noted    Abnormal ultrasound of breast 2018     Priority: Medium    Visual loss 10/23/2020    Chest discomfort 2019    Essential hypertension 2019    Family history of sudden cardiac death (SCD) 2019    Pure hypercholesterolemia 2019    Venous insufficiency of left lower extremity 04/15/2019    Asymptomatic telangiectasia 04/15/2019    Family history of anemia 11/15/2013    Vertigo 11/15/2013    Microcytic anemia 11/15/2013    Vitamin D deficiency 11/15/2013    Hypercoagulopathy ([address]      Past Medical History:   Diagnosis Date    Breast disorder     right anneurysm    Essential hypertension 2019    Hypercoagulopathy ([address]     Microcytic anemia 11/15/2013    Vertigo 11/15/2013    Vitamin D deficiency 11/15/2013     Past Surgical History:   Procedure Laterality Date     SECTION      X2    [facility]  2016    [facility]    EYE SURGERY Bilateral     lasik    [facility]OS[facility]  2016    [facility]      POLYPECTOMY  2016    [facility]    [place] COLON CA SCRN NOT  [address] IND N/A 2017    COL[facility]COPY [facility]       Family History   Problem Relation Age of Onset    Allergies Mother     Anemia Mother     Diabetes Father     Cancer Other     High Blood Pressure Other     Breast Cancer Neg Hx      Social History     Socioeconomic History    Marital status:      Spouse name: None    Number of children: None    Years of education: None    Highest education level: None   Occupational History    None   Social Needs    Financial resource strain: Not hard at all   [employer] insecurity     Worry: Never true     Inability: Never true   [employer] needs     Medical: No     Non-medical: No   Tobacco Use    Smoking status: Never Smoker    Smokeless tobacco: Never Used   Substance and Sexual Activity    Alcohol use: Yes     Comment: Ever Couple Months    Drug use: No    Sexual activity: Yes     Partners: Male   Lifestyle    Physical activity     Days per week: None     Minutes per session: None    Stress: None   Relationships    Social connections     Talks on phone: None     Gets together: None     Attends Hinduism service: None     Active member of club or organization: None     Attends meetings of clubs or organizations: None     Relationship status: None    Intimate partner violence     Fear of current or ex partner: None     Emotionally abused: None     Physically abused: None     Forced sexual activity: None   Other Topics Concern    None   Social History Narrative    None     Current Outpatient Medications on File Prior to Visit   Medication Sig Dispense Refill    Cholecalciferol (VITAMIN D) 50 MCG (2000 UT) CAPS capsule Take by mouth      Flaxseed, Linseed, (FLAXSEED OIL PO) Take by mouth      Multiple Vitamins-Minerals (MULTIVITAMIN ADULT PO) Take by mouth      aspirin 81 MG chewable tablet Take 81 mg by mouth daily      atorvastatin (LIPITOR) 20 MG tablet       levonorgestrel (MIRENA, 52 MG,) IUD 52 mg 1 each by Intrauterine route once      olmesartan (BENICAR) 5 MG tablet Take 1 tablet by mouth daily 30 tablet 11     No current facility-administered medications on file prior to visit. Allergies   Allergen Reactions    Lisinopril Other (See Comments)     COUGH    Penicillins Hives       Review of Systems   Constitutional: Negative for fatigue. Respiratory: Negative for cough and shortness of breath. Cardiovascular: Negative for chest pain.    Gastrointestinal: Negative for constipation and diarrhea. Objective  Vitals:    12/08/20 1032   BP: 120/82   Site: Left Upper Arm   Position: Sitting   Cuff Size: Large Adult   Pulse: 58   Temp: 97.2 °F (36.2 °C)   SpO2: 98%   Weight: 192 lb (87.1 kg)   Height: 5' 3\" (1.6 m)     Physical Exam  Vitals signs and nursing note reviewed. Constitutional:       Appearance: Normal appearance. She is normal weight. HENT:      Head: Normocephalic. Eyes:      Extraocular Movements: Extraocular movements intact. Conjunctiva/sclera: Conjunctivae normal.      Pupils: Pupils are equal, round, and reactive to light. Cardiovascular:      Rate and Rhythm: Normal rate and regular rhythm. Pulses: Normal pulses. Heart sounds: Normal heart sounds. Pulmonary:      Effort: Pulmonary effort is normal.      Breath sounds: Normal breath sounds. Skin:     General: Skin is warm. Neurological:      General: No focal deficit present. Mental Status: She is alert and oriented to person, place, and time. Mental status is at baseline. Psychiatric:         Mood and Affect: Mood normal.         Behavior: Behavior normal.         Thought Content: Thought content normal.         Judgment: Judgment normal.       Assessment & Plan     Diagnosis Orders   1. Borderline diabetes     2. Class 2 obesity due to excess calories without serious comorbidity with body mass index (BMI) of 35.0 to 35.9 in adult  phentermine (ADIPEX-P) 37.5 MG tablet       No orders of the defined types were placed in this encounter. Orders Placed This Encounter   Medications    phentermine (ADIPEX-P) 37.5 MG tablet     Sig: Take 1 tablet by mouth every morning (before breakfast) for 30 days. Dispense:  30 tablet     Refill:  0     Side effects, adverse effects of the medication prescribed today, as well as treatment plan/ rationale and result expectations have been discussed with the patient who expresses understanding and desires to proceed.     Close follow up to evaluate treatment results and for coordination of care. I have reviewed the patient's medical history in detail and updated the computerized patient record. As always, patient is advised that if symptoms worsen in any way they will proceed to the nearest emergency room. FU in 1 mos.     AWLLACE Garcia - CNP

## 2020-12-15 NOTE — TELEPHONE ENCOUNTER
MARLENY that labs where normal and MRA did not need to be done. And Dr. Whitfield Knows would discuss further at her appt in Jan.  If she had any questions or concerns to call the office back at [phone].

## 2021-01-13 ENCOUNTER — OFFICE VISIT (OUTPATIENT)
Dept: FAMILY MEDICINE CLINIC | Age: 56
End: 2021-01-13
Payer: COMMERCIAL

## 2021-01-13 VITALS
DIASTOLIC BLOOD PRESSURE: 62 MMHG | WEIGHT: 180 LBS | HEIGHT: 63 IN | TEMPERATURE: 98.1 F | BODY MASS INDEX: 31.89 KG/M2 | HEART RATE: 68 BPM | OXYGEN SATURATION: 98 % | SYSTOLIC BLOOD PRESSURE: 106 MMHG

## 2021-01-13 DIAGNOSIS — R19.7 DIARRHEA, UNSPECIFIED TYPE: Primary | ICD-10-CM

## 2021-01-13 DIAGNOSIS — K52.9 COLITIS: ICD-10-CM

## 2021-01-13 DIAGNOSIS — H04.129 DRY EYE: ICD-10-CM

## 2021-01-13 DIAGNOSIS — R19.7 DIARRHEA, UNSPECIFIED TYPE: ICD-10-CM

## 2021-01-13 PROCEDURE — 99213 OFFICE O/P EST LOW 20 MIN: CPT | Performed by: NURSE PRACTITIONER

## 2021-01-13 RX ORDER — POLYETHYLENE GLYCOL 400 AND PROPYLENE GLYCOL 4; 3 MG/ML; MG/ML
1 SOLUTION/ DROPS OPHTHALMIC PRN
Qty: 30 ML | Refills: 0 | Status: SHIPPED | OUTPATIENT
Start: 2021-01-13

## 2021-01-13 RX ORDER — METRONIDAZOLE 500 MG/1
500 TABLET ORAL 3 TIMES DAILY
Qty: 21 TABLET | Refills: 0 | Status: SHIPPED | OUTPATIENT
Start: 2021-01-13 | End: 2021-01-20

## 2021-01-13 RX ORDER — CIPROFLOXACIN 500 MG/1
500 TABLET, FILM COATED ORAL 2 TIMES DAILY
Qty: 14 TABLET | Refills: 0 | Status: SHIPPED | OUTPATIENT
Start: 2021-01-13 | End: 2021-01-20

## 2021-01-13 ASSESSMENT — ENCOUNTER SYMPTOMS
SHORTNESS OF BREATH: 0
DIARRHEA: 1
COUGH: 0
ABDOMINAL PAIN: 1

## 2021-01-13 ASSESSMENT — PATIENT HEALTH QUESTIONNAIRE - PHQ9: SUM OF ALL RESPONSES TO PHQ QUESTIONS 1-9: 0

## 2021-01-13 NOTE — PROGRESS NOTES
Subjective  Chief Complaint   Patient presents with    1 Month Follow-Up     adipex    Diarrhea     states that she was in the ER for an intestinal infection with colitis. states that in the last few days her BM's have been very soft again and wondering if she needs treated again. went to [facility]       HPI    Went to Er a few weeks ago. Dx with colitis. Given cipro and flagyl. Felt much better after taking and stomach started bothering her over last 48 hrs again. Pt worried bc she has to go out of town. Lost 12 lbs in the past month on adipex  Tolerating it well. Total of 21 lbs so far. No major side effects    Having dry eye issues. Has used otc drops without much relief.        Patient Active Problem List    Diagnosis Date Noted    Abnormal ultrasound of breast 2018     Priority: Medium    Visual loss 10/23/2020    Chest discomfort 2019    Essential hypertension 2019    Family history of sudden cardiac death (SCD) 2019    Pure hypercholesterolemia 2019    Venous insufficiency of left lower extremity 04/15/2019    Asymptomatic telangiectasia 04/15/2019    Family history of anemia 11/15/2013    Vertigo 11/15/2013    Microcytic anemia 11/15/2013    Vitamin D deficiency 11/15/2013    Hypercoagulopathy ([address]      Past Medical History:   Diagnosis Date    Breast disorder     right anneurysm    Essential hypertension 2019    Hypercoagulopathy ([address]     Microcytic anemia 11/15/2013    Vertigo 11/15/2013    Vitamin D deficiency 11/15/2013     Past Surgical History:   Procedure Laterality Date     SECTION      X2    [facility]    EYE SURGERY Bilateral     lasik    [facility]      [facility]    [facility]    [place] COLON [place] SCRN NOT  [address] IND N/A 2017    COLONOSCOPY [facility] by Elijah [facility]ck[facility]ley [facility] at [facility]  TUBAL LIGATION       Family History   Problem Relation Age of Onset    Allergies Mother     Anemia Mother     Diabetes Father     Cancer Other     High Blood Pressure Other     Breast Cancer Neg Hx      Social History     Socioeconomic History    Marital status:      Spouse name: None    Number of children: None    Years of education: None    Highest education level: None   Occupational History    None   Social Needs    Financial resource strain: Not hard at all   [place] insecurity     Worry: Never true     Inability: Never true    Transportation needs     Medical: No     Non-medical: No   Tobacco Use    Smoking status: Never Smoker    Smokeless tobacco: Never Used   Substance and Sexual Activity    Alcohol use: Yes     Comment: Ever Couple Months    Drug use: No    Sexual activity: Yes     Partners: Male   Lifestyle    Physical activity     Days per week: None     Minutes per session: None    Stress: None   Relationships    Social connections     Talks on phone: None     Gets together: None     Attends Roman Catholic service: None     Active member of club or organization: None     Attends meetings of clubs or organizations: None     Relationship status: None    Intimate partner violence     Fear of current or ex partner: None     Emotionally abused: None     Physically abused: None     Forced sexual activity: None   Other Topics Concern    None   Social History Narrative    None     Current Outpatient Medications on File Prior to Visit   Medication Sig Dispense Refill    Cholecalciferol (VITAMIN D) 50 MCG (2000 UT) CAPS capsule Take by mouth      Flaxseed, Linseed, (FLAXSEED OIL PO) Take by mouth      Multiple Vitamins-Minerals (MULTIVITAMIN ADULT PO) Take by mouth      aspirin 81 MG chewable tablet Take 81 mg by mouth daily      atorvastatin (LIPITOR) 20 MG tablet       levonorgestrel (MIRENA, 52 MG,) IUD 52 mg 1 each by Intrauterine route once metroNIDAZOLE (FLAGYL) 500 MG tablet          Orders Placed This Encounter   Procedures    Gastrointestinal Panel by DNA     Standing Status:   Future     Standing Expiration Date:   1/13/2022    Clostridium Difficile Toxin/Antigen     Standing Status:   Future     Standing Expiration Date:   1/13/2022       Orders Placed This Encounter   Medications    ciprofloxacin (CIPRO) 500 MG tablet     Sig: Take 1 tablet by mouth 2 times daily for 7 days     Dispense:  14 tablet     Refill:  0    metroNIDAZOLE (FLAGYL) 500 MG tablet     Sig: Take 1 tablet by mouth 3 times daily for 7 days     Dispense:  21 tablet     Refill:  0     Side effects, adverse effects of the medication prescribed today, as well as treatment plan/ rationale and result expectations have been discussed with the patient who expresses understanding and desires to proceed. Close follow up to evaluate treatment results and for coordination of care. I have reviewed the patient's medical history in detail and updated the computerized patient record. As always, patient is advised that if symptoms worsen in any way they will proceed to the nearest emergency room. Og in 1 mos.     Jordy Goddard, APRN - CNP

## 2021-01-14 ENCOUNTER — TELEPHONE (OUTPATIENT)
Dept: FAMILY MEDICINE CLINIC | Age: 56
End: 2021-01-14

## 2021-01-14 DIAGNOSIS — A49.8 CLOSTRIDIOIDES DIFFICILE INFECTION: Primary | ICD-10-CM

## 2021-01-14 LAB
C DIFF TOXIN/ANTIGEN: ABNORMAL
GI BACTERIAL PATHOGENS BY PCR: NORMAL

## 2021-01-14 RX ORDER — VANCOMYCIN HYDROCHLORIDE 125 MG/1
125 CAPSULE ORAL 4 TIMES DAILY
Qty: 40 CAPSULE | Refills: 0 | Status: SHIPPED | OUTPATIENT
Start: 2021-01-14 | End: 2021-01-24

## 2021-02-03 ENCOUNTER — OFFICE VISIT (OUTPATIENT)
Dept: NEUROLOGY | Age: 56
End: 2021-02-03
Payer: COMMERCIAL

## 2021-02-03 VITALS
HEART RATE: 101 BPM | WEIGHT: 180.1 LBS | BODY MASS INDEX: 31.9 KG/M2 | DIASTOLIC BLOOD PRESSURE: 78 MMHG | SYSTOLIC BLOOD PRESSURE: 119 MMHG

## 2021-02-03 DIAGNOSIS — D68.59 HYPERCOAGULOPATHY (HCC): ICD-10-CM

## 2021-02-03 DIAGNOSIS — H54.7 VISUAL LOSS: Primary | ICD-10-CM

## 2021-02-03 PROCEDURE — 99214 OFFICE O/P EST MOD 30 MIN: CPT | Performed by: PSYCHIATRY & NEUROLOGY

## 2021-02-03 ASSESSMENT — ENCOUNTER SYMPTOMS
CHOKING: 0
NAUSEA: 0
PHOTOPHOBIA: 0
SHORTNESS OF BREATH: 0
COLOR CHANGE: 0
TROUBLE SWALLOWING: 0
VOMITING: 0
BACK PAIN: 0

## 2021-02-03 NOTE — PROGRESS NOTES
Subjective:      Patient ID: Marge Friday is a 61 y.o. female who presents today for:  Chief Complaint   Patient presents with    Follow-up     Patient states that she has been doing fine since seen last. She says she has not had any issues with her vision besides that one time before she came to our office origionally.  Results     She says that she had an marisela, she says that she had a bubble test ekg, and a heart monitor, and something else she cant remember. HPI 76-year-old right-handed female history of transient visual loss which was noted from ophthalmology. We had evaluated extensively from cerebrovascular standpoint this included MRI of the brain and MRA. She had an agitated saline CUATE which was normal all her lipid test which includes complete hypercoagulable work-up is negative. She had mildly elevated sed rate and CRP though they are normal for age in terms of her sed rate and this does not appear to be temporal arteritis she did not have quite a headache. We will continue to watch this for develops headaches and will reevaluate. Patient is on Lipitor and aspirin which she will continue. Details of all investigations have been discussed and she is not any current symptoms.     Past Medical History:   Diagnosis Date    Breast disorder     right anneurysm    Essential hypertension 2019    Hypercoagulopathy ([address]     Microcytic anemia 11/15/2013    Vertigo 11/15/2013    Vitamin D deficiency 11/15/2013     Past Surgical History:   Procedure Laterality Date     SECTION      X2    [facility]    EYE SURGERY Bilateral     lasik    HYSTEROSCOPY  2016    [facility]CONTINENCE SURGERY      POLYPECTOMY  2016    [facility]    [place] COLON [place] SCRN NOT  [address] IND N/A 2017    COLONOSCOPY performed by Víctor Curry [facility] at [facility] History Socioeconomic History    Marital status:      Spouse name: Not on file    Number of children: Not on file    Years of education: Not on file    Highest education level: Not on file   Occupational History    Not on file   Social Needs    Financial resource strain: Not hard at all    Food insecurity     Worry: Never true     Inability: Never true   [employer] needs     Medical: No     Non-medical: No   Tobacco Use    Smoking status: Never Smoker    Smokeless tobacco: Never Used   Substance and Sexual Activity    Alcohol use: Yes     Comment: Ever Couple Months    Drug use: No    Sexual activity: Yes     Partners: Male   Lifestyle    Physical activity     Days per week: Not on file     Minutes per session: Not on file    Stress: Not on file   Relationships    Social connections     Talks on phone: Not on file     Gets together: Not on file     Attends Tenriism service: Not on file     Active member of club or organization: Not on file     Attends meetings of clubs or organizations: Not on file     Relationship status: Not on file    Intimate partner violence     Fear of current or ex partner: Not on file     Emotionally abused: Not on file     Physically abused: Not on file     Forced sexual activity: Not on file   Other Topics Concern    Not on file   Social History Narrative    Not on file     Family History   Problem Relation Age of Onset    Allergies Mother     Anemia Mother     Diabetes Father     Cancer Other     High Blood Pressure Other     Breast Cancer Neg Hx      Allergies   Allergen Reactions    Lisinopril Other (See Comments)     COUGH    Penicillins Hives       Current Outpatient Medications   Medication Sig Dispense Refill    polyethyl glycol-propyl glycol 0.4-0.3 % (SYSTANE) 0.4-0.3 % ophthalmic solution Place 1 drop into both eyes as needed for Dry Eyes 30 mL 0    Cholecalciferol (VITAMIN D) 50 MCG (2000 UT) CAPS capsule Take by mouth  Flaxseed, Linseed, (FLAXSEED OIL PO) Take by mouth      Multiple Vitamins-Minerals (MULTIVITAMIN ADULT PO) Take by mouth      aspirin 81 MG chewable tablet Take 81 mg by mouth daily      atorvastatin (LIPITOR) 20 MG tablet       levonorgestrel (MIRENA, 52 MG,) IUD 52 mg 1 each by Intrauterine route once      olmesartan (BENICAR) 5 MG tablet Take 1 tablet by mouth daily 30 tablet 11     No current facility-administered medications for this visit. Review of Systems   Constitutional: Negative for fever. HENT: Negative for ear pain, tinnitus and trouble swallowing. Eyes: Positive for visual disturbance. Negative for photophobia. Respiratory: Negative for choking and shortness of breath. Cardiovascular: Negative for chest pain and palpitations. Gastrointestinal: Negative for nausea and vomiting. Musculoskeletal: Negative for back pain, gait problem, joint swelling, myalgias, neck pain and neck stiffness. Skin: Negative for color change. Allergic/Immunologic: Negative for food allergies. Neurological: Negative for dizziness, tremors, seizures, syncope, facial asymmetry, speech difficulty, weakness, light-headedness, numbness and headaches. Psychiatric/Behavioral: Negative for behavioral problems, confusion, hallucinations and sleep disturbance. Objective:   /78 (Site: Left Upper Arm, Position: Sitting, Cuff Size: Medium Adult)   Pulse 101   Wt 180 lb 1.6 oz (81.7 kg)   LMP  (LMP Unknown)   BMI 31.90 kg/m²     Physical Exam  Vitals signs reviewed. Eyes:      Pupils: Pupils are equal, round, and reactive to light. Neck:      Musculoskeletal: Normal range of motion. Cardiovascular:      Rate and Rhythm: Normal rate and regular rhythm. Heart sounds: No murmur. Pulmonary:      Effort: Pulmonary effort is normal.      Breath sounds: Normal breath sounds. Abdominal:      General: Bowel sounds are normal.   Musculoskeletal: Normal range of motion.    Skin: General: Skin is warm. Neurological:      Mental Status: She is alert and oriented to person, place, and time. Cranial Nerves: No cranial nerve deficit. Sensory: No sensory deficit. Motor: No abnormal muscle tone. Coordination: Coordination normal.      Deep Tendon Reflexes: Reflexes are normal and symmetric. Babinski sign absent on the right side. Babinski sign absent on the left side. Psychiatric:         Mood and Affect: Mood normal.         Mra Neck Wo Contrast    Result Date: 11/7/2020  EXAMINATION: MRA NECK WO CONTRAST DATE AND TIME:11/7/2020 9:23 AM CLINICAL HISTORY: Stroke symptoms  H54.7 Visual loss ICD10 COMPARISON: None TECHNIQUE: Time-of-flight images of the common carotid arteries, carotid bifurcations and internal and external carotid arteries are performed. FINDINGS Carotid:  There is evidence of arteriosclerosis of the carotid bifurcation without hemodynamically significant stenosis, vascular dissection or aneurysm of the right common or internal carotid arteries. There is arteriosclerosis of the carotid bifurcation without hemodynamically significant stenosis, vascular dissection or aneurysm in the left common or internal carotid arteries. Cervical Vertebral Arteries:    Patency: The vertebral arteries are well visualized to the level of the basilar artery. There is no focal stenosis aneurysm or dissection. Vertebral arteries are codominant. There is evidence of arteriosclerosis of the bilateral carotid bifurcations without definite hemodynamically significant stenoses. If patient's symptoms persist may consider ultrasound evaluation.      Mri Brain Wo Contrast    Result Date: 11/7/2020 EXAMINATION: MRI BRAIN WO CONTRAST CLINICAL HISTORY: H54.7 Visual loss ICD10 COMPARISONS: NONE AVAILABLE TECHNIQUE: Multiplanar multisequence images of the brain were obtained without contrast. Diffusion perfusion imaging was obtained. FINDINGS:  There are no extra-axial collections. There is no evidence of hemorrhage. There are no areas of signal abnormality on perfusion diffusion imaging to suggest ischemia. The susceptibility images do not demonstrate evidence of hemosiderin deposition within  the brain parenchyma or the leptomeninges. There is preservation of the gray-white matter differentiation. There are a few scattered foci of T2/FLAIR increased signal in the subcortical and periventricular white matter without associated edema or mass effect. The sulci and ventricles are within normal limits without evidence of hydrocephalus. The midline structures are intact, the corpus callosum is within normal limits. The region of the pineal gland and the sella turcica are unremarkable. There are no space-occupying lesions in the posterior fossa. The basilar cisterns are patent. The craniocervical junction is unremarkable. The visualized portions of the orbits are within normal limits, the globes are intact. The visualized portions of the paranasal sinuses are within normal limits. The calvarium and soft tissues are unremarkable. There are no acute intracranial changes, no evidence of ischemia or hemorrhage. There are no regions of signal abnormality.        Lab Results   Component Value Date    WBC 8.2 10/06/2020    RBC 4.58 10/06/2020    HGB 12.9 10/06/2020    HCT 40.3 10/06/2020    MCV 88.0 10/06/2020    MCH 28.2 10/06/2020    MCHC 32.0 10/06/2020    RDW 14.1 10/06/2020     10/06/2020    MPV 9.2 11/06/2013     Lab Results   Component Value Date     10/06/2020    K 4.6 10/06/2020     10/06/2020    CO2 25 10/06/2020    BUN 10 10/06/2020    CREATININE 0.82 10/06/2020    GFRAA >60.0 10/06/2020 LABGLOM >60.0 10/06/2020    GLUCOSE 93 10/06/2020    PROT 7.9 10/06/2020    LABALBU 4.1 10/06/2020    CALCIUM 9.6 10/06/2020    BILITOT 0.3 10/06/2020    ALKPHOS 164 10/06/2020    AST 23 10/06/2020    ALT 13 10/06/2020     No results found for: PROTIME, INR  Lab Results   Component Value Date    TSH 1.780 09/10/2019    YAJTQEIJ98 391 11/15/2013    FOLATE 14.3 11/15/2013    IRON 50 10/06/2020    TIBC 353 10/06/2020     Lab Results   Component Value Date    TRIG 86 10/06/2020    HDL 46 10/06/2020    LDLCALC 135 10/06/2020     No results found for: Ferdie Romi, LABBENZ, CANNAB, COCAINESCRN, LABMETH, OPIATESCREENURINE, PHENCYCLIDINESCREENURINE, PPXUR, ETOH  No results found for: LITHIUM, DILFRTOT, VALPROATE    Assessment:       Diagnosis Orders   1. Visual loss     2. Hypercoagulopathy [facility])  Hepatic Function Panel     Acute visual loss in the right eye with normal evaluation when seen. She reported she had a curtain coming down her right eye without any associated features. She had 2 episodes and this is of concern. This could have been an acute ischemic optic neuropathy with hypercoagulable states. She may have had a acephalic migraine.   In any case she had embarked upon a complete evaluation of the same and with an MRI of the brain which is normal and she had an MRA of the head which is normal .  She had MRI a of the neck which is normal And a complete hypercoagulable work-up which is extensive includes antiphospholipid antibodies protein C protein S Antithrombin III. Patient had a factor V Leyden evaluation as well. Her ESR is normal for age though her CRP is mildly elevated there may be some inflammatory marker here. We therefore recommended continuation of aspirin and Lipitor lifelong for now unless she has side effects we will follow this up with a liver function test in 3 months. Patient already had a CUATE with saline agitation at [facility] which I reviewed and was normal.  We will keep an eye on this and she will let me know if she has any recurrent symptoms to further investigate. Plan:      Orders Placed This Encounter   Procedures    Hepatic Function Panel     In 3 months     Standing Status:   Future     Standing Expiration Date:   2/3/2022     No orders of the defined types were placed in this encounter. No follow-ups on file.       Fe Shen MD

## 2021-02-10 ENCOUNTER — OFFICE VISIT (OUTPATIENT)
Dept: FAMILY MEDICINE CLINIC | Age: 56
End: 2021-02-10
Payer: COMMERCIAL

## 2021-02-10 VITALS
TEMPERATURE: 98.3 F | OXYGEN SATURATION: 97 % | SYSTOLIC BLOOD PRESSURE: 130 MMHG | WEIGHT: 179.8 LBS | HEART RATE: 74 BPM | BODY MASS INDEX: 31.86 KG/M2 | DIASTOLIC BLOOD PRESSURE: 84 MMHG | HEIGHT: 63 IN

## 2021-02-10 DIAGNOSIS — E66.9 OBESITY (BMI 30-39.9): ICD-10-CM

## 2021-02-10 DIAGNOSIS — E66.1 CLASS 1 DRUG-INDUCED OBESITY WITH SERIOUS COMORBIDITY AND BODY MASS INDEX (BMI) OF 31.0 TO 31.9 IN ADULT: ICD-10-CM

## 2021-02-10 DIAGNOSIS — A49.8 RECURRENT CLOSTRIDIOIDES DIFFICILE INFECTION: Primary | ICD-10-CM

## 2021-02-10 PROCEDURE — 99213 OFFICE O/P EST LOW 20 MIN: CPT | Performed by: NURSE PRACTITIONER

## 2021-02-10 RX ORDER — PHENTERMINE HYDROCHLORIDE 37.5 MG/1
37.5 TABLET ORAL
Qty: 30 TABLET | Refills: 0 | Status: SHIPPED | OUTPATIENT
Start: 2021-02-10 | End: 2021-03-12

## 2021-02-10 ASSESSMENT — ENCOUNTER SYMPTOMS
DIARRHEA: 0
SHORTNESS OF BREATH: 0
COUGH: 0
CONSTIPATION: 0

## 2021-02-10 NOTE — PROGRESS NOTES
Subjective  Chief Complaint   Patient presents with    1 Month Follow-Up     adipex follow up. HPI     Here for fu for Cdiff and adipex. Doing a lot better with cdiff. No more symptoms. They resolved after a couple of days of repeat symptoms. Has a virtual appt with GI in a week or so. Has lost about one pound with adipex. States that she is still trying with her diet and exercise. Tolerating medication well.     Patient Active Problem List    Diagnosis Date Noted    Abnormal ultrasound of breast 2018     Priority: Medium    Visual loss 10/23/2020    Chest discomfort 2019    Essential hypertension 2019    Family history of sudden cardiac death (SCD) 2019    Pure hypercholesterolemia 2019    Venous insufficiency of left lower extremity 04/15/2019    Asymptomatic telangiectasia 04/15/2019    Family history of anemia 11/15/2013    Vertigo 11/15/2013    Microcytic anemia 11/15/2013    Vitamin D deficiency 11/15/2013    Hypercoagulopathy ([address]      Past Medical History:   Diagnosis Date    Breast disorder     right anneurysm    Essential hypertension 2019    Hypercoagulopathy ([address]     Microcytic anemia 11/15/2013    Vertigo 11/15/2013    Vitamin D deficiency 11/15/2013     Past Surgical History:   Procedure Laterality Date     SECTION      X2    [facility]    EYE SURGERY Bilateral     lasik    [facility]  2016    [facility]      POL[facility]CTOMY  2016    [facility]    [place] COLON CA SCRN NOT  [address] IND N/A 2017    [facility]       Family History   Problem Relation Age of Onset    Allergies Mother     Anemia Mother     Diabetes Father     Cancer Other     High Blood Pressure Other     Breast Cancer Neg Hx      Social History     Socioeconomic History Sig: Take 1 tablet by mouth every morning (before breakfast) for 30 days. Dispense:  30 tablet     Refill:  0     Side effects, adverse effects of the medication prescribed today, as well as treatment plan/ rationale and result expectations have been discussed with the patient who expresses understanding and desires to proceed. Close follow up to evaluate treatment results and for coordination of care. I have reviewed the patient's medical history in detail and updated the computerized patient record. As always, patient is advised that if symptoms worsen in any way they will proceed to the nearest emergency room. FU in 1 mos.     Meka Mccracken, WALLACE - CNP

## 2021-02-24 ENCOUNTER — VIRTUAL VISIT (OUTPATIENT)
Dept: GASTROENTEROLOGY | Age: 56
End: 2021-02-24
Payer: COMMERCIAL

## 2021-02-24 DIAGNOSIS — Z86.19 HX OF CLOSTRIDIUM DIFFICILE INFECTION: Primary | ICD-10-CM

## 2021-02-24 PROCEDURE — 99203 OFFICE O/P NEW LOW 30 MIN: CPT | Performed by: NURSE PRACTITIONER

## 2021-02-24 ASSESSMENT — ENCOUNTER SYMPTOMS
BLOOD IN STOOL: 0
CHEST TIGHTNESS: 0
NAUSEA: 0
COLOR CHANGE: 0
SHORTNESS OF BREATH: 0
DIARRHEA: 0
ABDOMINAL PAIN: 0
VOMITING: 0
PHOTOPHOBIA: 0
EYE REDNESS: 0
EYE PAIN: 0
RECTAL PAIN: 0
ANAL BLEEDING: 0
CONSTIPATION: 0
WHEEZING: 0
TROUBLE SWALLOWING: 0
ABDOMINAL DISTENTION: 0
VOICE CHANGE: 0

## 2021-02-24 NOTE — PROGRESS NOTES
Psychiatric/Behavioral: Negative for confusion and suicidal ideas. Prior to Visit Medications    Medication Sig Taking? Authorizing Provider   polyethyl glycol-propyl glycol 0.4-0.3 % (SYSTANE) 0.4-0.3 % ophthalmic solution Place 1 drop into both eyes as needed for Dry Eyes Yes WALLACE Huang CNP   Cholecalciferol (VITAMIN D) 50 MCG ( UT) CAPS capsule Take by mouth Yes Historical Provider, MD   Flaxed, Linseed, (FLAXSEED OIL PO) Take by mouth Yes Historical Provider, MD   Multiple Vitamins-Minerals (MULTIVITAMIN ADULT PO) Take by mouth Yes Historical Provider, MD   aspirin 81 MG chewable tablet Take 81 mg by mouth daily Yes Historical Provider, MD   atorvastatin (LIPITOR) 20 MG tablet  Yes Historical Provider, MD   levonorgestrel (MIRENA, 52 MG,) IUD 52 mg 1 each by Intrauterine route once Yes Historical Provider, MD   olmesartan (BENICAR) 5 MG tablet Take 1 tablet by mouth daily Yes WALLACE Huang CNP   phentermine (ADIPEX-P) 37.5 MG tablet Take 1 tablet by mouth every morning (before breakfast) for 30 days.   Patient not taking: Reported on 2021  WALLACE Huang CNP       Social History     Tobacco Use    Smoking status: Never Smoker    Smokeless tobacco: Never Used   Substance Use Topics    Alcohol use: Yes     Comment: Ever Couple Months    Drug use: No        Allergies   Allergen Reactions    Lisinopril Other (See Comments)     COUGH    Penicillins Hives   ,   Past Medical History:   Diagnosis Date    Breast disorder     right anneurysm    Essential hypertension 2019    Hypercoagulopathy ([address]     Microcytic anemia 11/15/2013    Vertigo 11/15/2013    Vitamin D deficiency 11/15/2013   ,   Past Surgical History:   Procedure Laterality Date     SECTION      X2    [facility]    EYE SURGERY Bilateral     lasik    [facility]COPY  2016    [facility]    INCONTINENCE SURGERY  [facility]    [place] COLON CA SCRN NOT  [address] IND N/A 5/24/2017    COLONOSCOPY performed by Allan Peña MD at [facility]     ,   Social History     Tobacco Use    Smoking status: Never Smoker    Smokeless tobacco: Never Used   Substance Use Topics    Alcohol use: Yes     Comment: Ever Couple Months    Drug use: No   ,   Family History   Problem Relation Age of Onset    Allergies Mother     Anemia Mother     Diabetes Father     Cancer Other     High Blood Pressure Other     Breast Cancer Neg Hx     Colon Cancer Neg Hx     Colon Polyps Neg Hx     Crohn's Disease Neg Hx        PHYSICAL EXAMINATION: Limited due to telephone exam  [ INSTRUCTIONS:  \"[x]\" Indicates a positive item  \"[]\" Indicates a negative item  -- DELETE ALL ITEMS NOT EXAMINED]  [x] Alert  [x] Oriented to person/place/time    [] No apparent distress  [] Toxic appearing    [] Face flushed appearing [] Sclera clear  [] Lips are cyanotic      [] Breathing appears normal  [] Appears tachypneic      [] Rash on visible skin    [] Cranial Nerves II-XII grossly intact    [] Motor grossly intact in visible upper extremities    [] Motor grossly intact in visible lower extremities    [] Normal Mood  [] Anxious appearing    [] Depressed appearing  [] Confused appearing      [] Poor short term memory  [] Poor long term memory    [] OTHER:      Due to this being a TeleHealth encounter, evaluation of the following organ systems is limited: Vitals/Constitutional/EENT/Resp/CV/GI//MS/Neuro/Skin/Heme-Lymph-Imm. ASSESSMENT/PLAN:  1. Hx of C Diff infection  Hx of C diff in January 2020 after clindamycin for tooth infection, treated with vancomycin, now asymptomatic, discussed pathophysiology of C -Diff at length including treatment options for reoccurrence and fecal transplant requirements. 2. Associated medical conditions including but not limited too. Rosita Ha Dyslipidemia, hypertension, microcytic anemia. 3.  Preventative health. Previous colonoscopy by Dr. Pierre Lares in 2017 was notable for diverticulosis, no polyps, with repeat recommendations in 10 years      Return if symptoms worsen or fail to improve. An  electronic signature was used to authenticate this note. --Phillip Sanchez, APRN - CNP on 2/24/2021 at 9:28 AM        Pursuant to the emergency declaration under the [address] [place] [address], [facility] waiver authority and the [employer] Act, this Virtual  Visit was conducted, with patient's consent, to reduce the patient's risk of exposure to COVID-19 and provide continuity of care for an established patient. Services were provided through a video synchronous discussion virtually to substitute for in-person clinic visit.

## 2021-03-10 ENCOUNTER — OFFICE VISIT (OUTPATIENT)
Dept: FAMILY MEDICINE CLINIC | Age: 56
End: 2021-03-10
Payer: COMMERCIAL

## 2021-03-10 VITALS
BODY MASS INDEX: 31.64 KG/M2 | HEIGHT: 63 IN | OXYGEN SATURATION: 98 % | WEIGHT: 178.6 LBS | SYSTOLIC BLOOD PRESSURE: 132 MMHG | HEART RATE: 73 BPM | DIASTOLIC BLOOD PRESSURE: 80 MMHG | TEMPERATURE: 98.1 F

## 2021-03-10 DIAGNOSIS — R73.03 BORDERLINE DIABETES: Primary | ICD-10-CM

## 2021-03-10 DIAGNOSIS — E66.09 CLASS 1 OBESITY DUE TO EXCESS CALORIES WITHOUT SERIOUS COMORBIDITY WITH BODY MASS INDEX (BMI) OF 31.0 TO 31.9 IN ADULT: ICD-10-CM

## 2021-03-10 PROCEDURE — 99213 OFFICE O/P EST LOW 20 MIN: CPT | Performed by: NURSE PRACTITIONER

## 2021-03-10 ASSESSMENT — ENCOUNTER SYMPTOMS
RESPIRATORY NEGATIVE: 1
GASTROINTESTINAL NEGATIVE: 1

## 2021-03-10 NOTE — PROGRESS NOTES
High Blood Pressure Other     Breast Cancer Neg Hx     Colon Cancer Neg Hx     Colon Polyps Neg Hx     Crohn's Disease Neg Hx      Social History     Socioeconomic History    Marital status:      Spouse name: None    Number of children: None    Years of education: None    Highest education level: None   Occupational History    None   Social Needs    Financial resource strain: Not hard at all   Juanjo-Eduardo insecurity     Worry: Never true     Inability: Never true    Transportation needs     Medical: No     Non-medical: No   Tobacco Use    Smoking status: Never Smoker    Smokeless tobacco: Never Used   Substance and Sexual Activity    Alcohol use: Yes     Comment: Ever Couple Months    Drug use: No    Sexual activity: Yes     Partners: Male   Lifestyle    Physical activity     Days per week: None     Minutes per session: None    Stress: None   Relationships    Social connections     Talks on phone: None     Gets together: None     Attends Yarsanism service: None     Active member of club or organization: None     Attends meetings of clubs or organizations: None     Relationship status: None    Intimate partner violence     Fear of current or ex partner: None     Emotionally abused: None     Physically abused: None     Forced sexual activity: None   Other Topics Concern    None   Social History Narrative    None     Current Outpatient Medications on File Prior to Visit   Medication Sig Dispense Refill    polyethyl glycol-propyl glycol 0.4-0.3 % (SYSTANE) 0.4-0.3 % ophthalmic solution Place 1 drop into both eyes as needed for Dry Eyes 30 mL 0    Cholecalciferol (VITAMIN D) 50 MCG (2000 UT) CAPS capsule Take by mouth      Flaxseed, Linseed, (FLAXSEED OIL PO) Take by mouth      Multiple Vitamins-Minerals (MULTIVITAMIN ADULT PO) Take by mouth      aspirin 81 MG chewable tablet Take 81 mg by mouth daily      atorvastatin (LIPITOR) 20 MG tablet       levonorgestrel (MIRENA, 52 MG,) IUD 52 mg 1 each by Intrauterine route once      olmesartan (BENICAR) 5 MG tablet Take 1 tablet by mouth daily 30 tablet 11    phentermine (ADIPEX-P) 37.5 MG tablet Take 1 tablet by mouth every morning (before breakfast) for 30 days. (Patient not taking: Reported on 3/10/2021) 30 tablet 0     No current facility-administered medications on file prior to visit. Allergies   Allergen Reactions    Lisinopril Other (See Comments)     COUGH    Penicillins Hives       Review of Systems   Constitutional: Negative. Respiratory: Negative. Cardiovascular: Negative. Gastrointestinal: Negative. Skin: Negative. Neurological: Negative. Psychiatric/Behavioral: Negative. Objective  Vitals:    03/10/21 1001   BP: 132/80   Site: Left Upper Arm   Position: Sitting   Cuff Size: Medium Adult   Pulse: 73   Temp: 98.1 °F (36.7 °C)   SpO2: 98%   Weight: 178 lb 9.6 oz (81 kg)   Height: 5' 3\" (1.6 m)     Physical Exam  Vitals signs and nursing note reviewed. HENT:      Head: Normocephalic. Cardiovascular:      Rate and Rhythm: Normal rate and regular rhythm. Pulmonary:      Effort: Pulmonary effort is normal.   Musculoskeletal: Normal range of motion. Skin:     General: Skin is warm and dry. Neurological:      Mental Status: She is alert. Psychiatric:         Mood and Affect: Mood normal.         Behavior: Behavior normal.       Assessment & Plan     Diagnosis Orders   1. Class 1 obesity due to excess calories without serious comorbidity with body mass index (BMI) of 31.0 to 31.9 in adult  naltrexone-buPROPion (CONTRAVE) 8-90 MG per extended release tablet       No orders of the defined types were placed in this encounter.       Orders Placed This Encounter   Medications    naltrexone-buPROPion (CONTRAVE) 8-90 MG per extended release tablet     Sig: Take 2 tablets by mouth 2 times daily     Dispense:  120 tablet     Refill:  2       Side effects, adverse effects of the medication prescribed today, as well as

## 2021-03-22 ENCOUNTER — TELEPHONE (OUTPATIENT)
Dept: FAMILY MEDICINE CLINIC | Age: 56
End: 2021-03-22

## 2021-03-22 NOTE — TELEPHONE ENCOUNTER
Patient calling states she was told by ins a prior Jasmeet Mishra is needed for contrave. Please complete and call patient back with update.      Pt ph. [phone]

## 2021-03-22 NOTE — TELEPHONE ENCOUNTER
PA started through [facility]. Also spoke to pt about ins, stated her BIN number is [id number], PCN is Babatunde MARTÍNEZ [id number], and ID number is [id number].

## 2021-05-19 ENCOUNTER — TELEPHONE (OUTPATIENT)
Dept: FAMILY MEDICINE CLINIC | Age: 56
End: 2021-05-19

## 2021-05-19 NOTE — TELEPHONE ENCOUNTER
Is she sure this is not due to her deductible? I have always ordered c.diff cultures under the dx of diarrhea and have never had a problem since that is the primary symptoms of cdiff.  Not to mention it was positive?!

## 2021-05-28 NOTE — TELEPHONE ENCOUNTER
I called the lab billing office to confirm what was said, the billing office did state that the dx needs to be more detailed. I can send this to Masha Smith to have this reviewed.

## 2021-09-03 ENCOUNTER — OFFICE VISIT (OUTPATIENT)
Dept: NEUROLOGY | Age: 56
End: 2021-09-03
Payer: COMMERCIAL

## 2021-09-03 VITALS
SYSTOLIC BLOOD PRESSURE: 132 MMHG | DIASTOLIC BLOOD PRESSURE: 86 MMHG | HEART RATE: 74 BPM | BODY MASS INDEX: 34.37 KG/M2 | WEIGHT: 194 LBS

## 2021-09-03 DIAGNOSIS — E55.9 VITAMIN D DEFICIENCY: ICD-10-CM

## 2021-09-03 DIAGNOSIS — D68.59 HYPERCOAGULOPATHY (HCC): ICD-10-CM

## 2021-09-03 DIAGNOSIS — H54.7 VISUAL LOSS: Primary | ICD-10-CM

## 2021-09-03 PROCEDURE — 99213 OFFICE O/P EST LOW 20 MIN: CPT | Performed by: PSYCHIATRY & NEUROLOGY

## 2021-09-03 ASSESSMENT — ENCOUNTER SYMPTOMS
BACK PAIN: 0
COLOR CHANGE: 0
SHORTNESS OF BREATH: 0
PHOTOPHOBIA: 0
NAUSEA: 0
TROUBLE SWALLOWING: 0
CHOKING: 0
VOMITING: 0

## 2021-09-03 NOTE — PROGRESS NOTES
Subjective:      Patient ID: Makenna Dan is a 61 y.o. female who presents today for:  Chief Complaint   Patient presents with    Follow-up     Pt states that things have been going good, SHe says she has not had any more incidents of vision loss. HPI 58-year-old right-handed female history of transient visual loss patient has been extensively evaluated. Is not any further symptoms but continues on Lipitor and aspirin. Substation will further obtain laboratory tests and her hepatic functions appear to be normal but she has elevated alkaline phosphatase this may be followed as an outpatient. This may be a mobility I recommend that she exercise and take Calcium  and vitamin D for now. Patient has not the visual dysfunction or ocular migraines or anything to do with her vision.   Past Medical History:   Diagnosis Date    Breast disorder     right anneurysm    Essential hypertension 2019    Hypercoagulopathy ([address]     Microcytic anemia 11/15/2013    Vertigo 11/15/2013    Vitamin D deficiency 11/15/2013     Past Surgical History:   Procedure Laterality Date     SECTION      X2    [facility]  2016    [facility]    EYE SURGERY Bilateral     lasik    [facility]COPY  2016    [facility]CONT[facility]ENCE [facility]      POLYPECTOMY  2016    [facility]    ME COLON CA SCRN NOT  [address] IND N/A 2017    COL[facility] by [facility]ger [facility] at [facility] History     Socioeconomic History    Marital status:      Spouse name: Not on file    Number of children: Not on file    Years of education: Not on file    Highest education level: Not on file   Occupational History    Not on file   Tobacco Use    Smoking status: Never Smoker    Smokeless tobacco: Never Used   Vaping Use    Vaping Use: Never used   Substance and Sexual Activity    Alcohol use: Yes     Comment: Ever Couple Months    Drug use: No    Sexual activity: Yes     Partners: Male   Other Topics Concern    Not on file   Social History Narrative    Not on file     Social Determinants of Health     Financial Resource Strain:     Difficulty of Paying Living Expenses:    Food Insecurity:     Worried About Running Out of Food in the Last Year:     [address] in the Last Year:    Transportation Needs:     Lack of Transportation (Medical):      Lack of Transportation (Non-Medical):    Physical Activity:     Days of Exercise per Week:     Minutes of Exercise per Session:    Stress:     Feeling of Stress :    Social Connections:     Frequency of Communication with Friends and Family:     Frequency of Social Gatherings with Friends and Family:     Attends Judaism Services:     Active Member of Clubs or Organizations:     Attends Club or Organization Meetings:     Marital Status:    Intimate Partner Violence:     Fear of Current or Ex-Partner:     Emotionally Abused:     Physically Abused:     Sexually Abused:      Family History   Problem Relation Age of Onset    Allergies Mother     Anemia Mother     Diabetes Father     Cancer Other     High Blood Pressure Other     Breast Cancer Neg Hx     Colon Cancer Neg Hx     Colon Polyps Neg Hx     Crohn's Disease Neg Hx      Allergies   Allergen Reactions    Lisinopril Other (See Comments)     COUGH  COUGH    Penicillins Hives       Current Outpatient Medications   Medication Sig Dispense Refill    naltrexone-buPROPion (CONTRAVE) 8-90 MG per extended release tablet Take 2 tablets by mouth 2 times daily 120 tablet 2    polyethyl glycol-propyl glycol 0.4-0.3 % (SYSTANE) 0.4-0.3 % ophthalmic solution Place 1 drop into both eyes as needed for Dry Eyes 30 mL 0    Cholecalciferol (VITAMIN D) 50 MCG (2000 UT) CAPS capsule Take by mouth      Flaxseed, Linseed, (FLAXSEED OIL PO) Take by mouth      Multiple Vitamins-Minerals (MULTIVITAMIN ADULT PO) Take by mouth      aspirin 81 MG chewable tablet Take 81 mg by mouth daily      atorvastatin (LIPITOR) 20 MG tablet       levonorgestrel (MIRENA, 52 MG,) IUD 52 mg 1 each by Intrauterine route once      olmesartan (BENICAR) 5 MG tablet Take 1 tablet by mouth daily 30 tablet 11     No current facility-administered medications for this visit. Review of Systems   Constitutional: Negative for fever. HENT: Negative for ear pain, tinnitus and trouble swallowing. Eyes: Negative for photophobia and visual disturbance. Respiratory: Negative for choking and shortness of breath. Cardiovascular: Negative for chest pain and palpitations. Gastrointestinal: Negative for nausea and vomiting. Musculoskeletal: Negative for back pain, gait problem, joint swelling, myalgias, neck pain and neck stiffness. Skin: Negative for color change. Allergic/Immunologic: Negative for food allergies. Neurological: Negative for dizziness, tremors, seizures, syncope, facial asymmetry, speech difficulty, weakness, light-headedness, numbness and headaches. Psychiatric/Behavioral: Negative for behavioral problems, confusion, hallucinations and sleep disturbance. Objective:   /86 (Site: Left Upper Arm, Position: Sitting, Cuff Size: Medium Adult)   Pulse 74   Wt 194 lb (88 kg)   BMI 34.37 kg/m²     Physical Exam    MRA NECK WO CONTRAST    Result Date: 11/7/2020  EXAMINATION: MRA NECK WO CONTRAST DATE AND TIME:11/7/2020 9:23 AM CLINICAL HISTORY: Stroke symptoms  H54.7 Visual loss ICD10 COMPARISON: None TECHNIQUE: Time-of-flight images of the common carotid arteries, carotid bifurcations and internal and external carotid arteries are performed. FINDINGS Carotid:  There is evidence of arteriosclerosis of the carotid bifurcation without hemodynamically significant stenosis, vascular dissection or aneurysm of the right common or internal carotid arteries.          There is arteriosclerosis of the carotid bifurcation without acute intracranial changes, no evidence of ischemia or hemorrhage. There are no regions of signal abnormality. Lab Results   Component Value Date    WBC 8.2 10/06/2020    RBC 4.58 10/06/2020    HGB 12.9 10/06/2020    HCT 40.3 10/06/2020    MCV 88.0 10/06/2020    MCH 28.2 10/06/2020    MCHC 32.0 10/06/2020    RDW 14.1 10/06/2020     10/06/2020    MPV 9.2 11/06/2013     Lab Results   Component Value Date     10/06/2020    K 4.6 10/06/2020     10/06/2020    CO2 25 10/06/2020    BUN 10 10/06/2020    CREATININE 0.82 10/06/2020    GFRAA >60.0 10/06/2020    LABGLOM >60.0 10/06/2020    GLUCOSE 93 10/06/2020    PROT 7.5 08/20/2021    LABALBU 4.1 08/20/2021    CALCIUM 9.6 10/06/2020    BILITOT 0.4 08/20/2021    ALKPHOS 197 08/20/2021    AST 28 08/20/2021    ALT 16 08/20/2021     No results found for: PROTIME, INR  Lab Results   Component Value Date    TSH 1.780 09/10/2019    RGZUEZKL62 391 11/15/2013    FOLATE 14.3 11/15/2013    IRON 50 10/06/2020    TIBC 353 10/06/2020     Lab Results   Component Value Date    TRIG 86 10/06/2020    HDL 46 10/06/2020    LDLCALC 135 10/06/2020     No results found for: Bartolo Hymen, LABBENZ, CANNAB, COCAINESCRN, LABMETH, OPIATESCREENURINE, PHENCYCLIDINESCREENURINE, PPXUR, ETOH  No results found for: LITHIUM, DILFRTOT, VALPROATE    Assessment:       Diagnosis Orders   1. Visual loss     2. Vitamin D deficiency     3. Hypercoagulopathy (HCC)     Visual loss which has not reoccurred. This may have been a single platelet plug. She has extensive lesion including T and all her hypercoagulable work-up is normal.  We will continue on aspirin and Lipitor. She has elevated alkaline phosphatase which is bone related and may be secondary mobility.   She has vitamin D deficiency and recommend she continue calcium vitamin D and exercise and follow that as an outpatient we will see her in a year and earlier if there are any concerns patient has not any ocular migraines or any visual dysfunctions and tolerating the medication very well. Plan:      No orders of the defined types were placed in this encounter. No orders of the defined types were placed in this encounter. No follow-ups on file.       Karl De La O MD

## 2021-10-25 DIAGNOSIS — I10 ESSENTIAL HYPERTENSION: ICD-10-CM

## 2021-10-25 RX ORDER — OLMESARTAN MEDOXOMIL 5 MG/1
5 TABLET ORAL DAILY
Qty: 90 TABLET | Refills: 3 | Status: SHIPPED | OUTPATIENT
Start: 2021-10-25 | End: 2022-10-27

## 2021-12-17 NOTE — TELEPHONE ENCOUNTER
PATIENT CALLED BACK and is wanting to know if she still needs the MRA head? It was denied by insurance and she is wondering if she needs to fight with them for it. Also she has questions about her bloodwork.      Mathew Kohler Detail Level: Zone Render Note In Bullet Format When Appropriate: No Include Z78.9 (Other Specified Conditions Influencing Health Status) As An Associated Diagnosis?: Yes Number Of Freeze-Thaw Cycles: 2 freeze-thaw cycles Post-Care Instructions: I reviewed with the patient in detail post-care instructions. Patient is to wear sunprotection, and avoid picking at any of the treated lesions. Pt may apply Vaseline to crusted or scabbing areas. Consent: The patient's consent was obtained including but not limited to risks of crusting, scabbing, blistering, scarring, darker or lighter pigmentary change, recurrence, incomplete removal and infection. Duration Of Freeze Thaw-Cycle (Seconds): 10 Medical Necessity Information: It is in your best interest to select a reason for this procedure from the list below. All of these items fulfill various CMS LCD requirements except the new and changing color options. Medical Necessity Clause: This procedure was medically necessary because the lesions that were treated were: irritated

## 2022-04-11 ENCOUNTER — OFFICE VISIT (OUTPATIENT)
Dept: FAMILY MEDICINE CLINIC | Age: 57
End: 2022-04-11
Payer: COMMERCIAL

## 2022-04-11 VITALS
BODY MASS INDEX: 36.5 KG/M2 | WEIGHT: 206 LBS | OXYGEN SATURATION: 98 % | DIASTOLIC BLOOD PRESSURE: 82 MMHG | SYSTOLIC BLOOD PRESSURE: 124 MMHG | HEIGHT: 63 IN | HEART RATE: 70 BPM

## 2022-04-11 DIAGNOSIS — E78.00 PURE HYPERCHOLESTEROLEMIA: ICD-10-CM

## 2022-04-11 DIAGNOSIS — R53.83 FATIGUE, UNSPECIFIED TYPE: ICD-10-CM

## 2022-04-11 DIAGNOSIS — I10 ESSENTIAL HYPERTENSION: Primary | ICD-10-CM

## 2022-04-11 DIAGNOSIS — R73.03 BORDERLINE DIABETES: ICD-10-CM

## 2022-04-11 DIAGNOSIS — I10 ESSENTIAL HYPERTENSION: ICD-10-CM

## 2022-04-11 DIAGNOSIS — E66.01 CLASS 2 SEVERE OBESITY DUE TO EXCESS CALORIES WITH SERIOUS COMORBIDITY AND BODY MASS INDEX (BMI) OF 36.0 TO 36.9 IN ADULT (HCC): ICD-10-CM

## 2022-04-11 LAB
ALBUMIN SERPL-MCNC: 4.1 G/DL (ref 3.5–4.6)
ALP BLD-CCNC: 179 U/L (ref 40–130)
ALT SERPL-CCNC: 15 U/L (ref 0–33)
ANION GAP SERPL CALCULATED.3IONS-SCNC: 11 MEQ/L (ref 9–15)
AST SERPL-CCNC: 33 U/L (ref 0–35)
BASOPHILS ABSOLUTE: 0.1 K/UL (ref 0–0.2)
BASOPHILS RELATIVE PERCENT: 0.7 %
BILIRUB SERPL-MCNC: <0.2 MG/DL (ref 0.2–0.7)
BUN BLDV-MCNC: 12 MG/DL (ref 6–20)
CALCIUM SERPL-MCNC: 9.3 MG/DL (ref 8.5–9.9)
CHLORIDE BLD-SCNC: 102 MEQ/L (ref 95–107)
CHOLESTEROL, TOTAL: 191 MG/DL (ref 0–199)
CO2: 23 MEQ/L (ref 20–31)
CREAT SERPL-MCNC: 0.73 MG/DL (ref 0.5–0.9)
EOSINOPHILS ABSOLUTE: 0.2 K/UL (ref 0–0.7)
EOSINOPHILS RELATIVE PERCENT: 2.6 %
GFR AFRICAN AMERICAN: >60
GFR NON-AFRICAN AMERICAN: >60
GLOBULIN: 3.3 G/DL (ref 2.3–3.5)
GLUCOSE BLD-MCNC: 92 MG/DL (ref 70–99)
HBA1C MFR BLD: 6 % (ref 4.8–5.9)
HCT VFR BLD CALC: 37.9 % (ref 37–47)
HDLC SERPL-MCNC: 41 MG/DL (ref 40–59)
HEMOGLOBIN: 11.9 G/DL (ref 12–16)
LDL CHOLESTEROL CALCULATED: 127 MG/DL (ref 0–129)
LYMPHOCYTES ABSOLUTE: 2.6 K/UL (ref 1–4.8)
LYMPHOCYTES RELATIVE PERCENT: 30 %
MCH RBC QN AUTO: 27.8 PG (ref 27–31.3)
MCHC RBC AUTO-ENTMCNC: 31.4 % (ref 33–37)
MCV RBC AUTO: 88.4 FL (ref 82–100)
MONOCYTES ABSOLUTE: 0.5 K/UL (ref 0.2–0.8)
MONOCYTES RELATIVE PERCENT: 5.6 %
NEUTROPHILS ABSOLUTE: 5.3 K/UL (ref 1.4–6.5)
NEUTROPHILS RELATIVE PERCENT: 61.1 %
PDW BLD-RTO: 15 % (ref 11.5–14.5)
PLATELET # BLD: 286 K/UL (ref 130–400)
POTASSIUM SERPL-SCNC: 4.6 MEQ/L (ref 3.4–4.9)
RBC # BLD: 4.28 M/UL (ref 4.2–5.4)
SODIUM BLD-SCNC: 136 MEQ/L (ref 135–144)
TOTAL PROTEIN: 7.4 G/DL (ref 6.3–8)
TRIGL SERPL-MCNC: 114 MG/DL (ref 0–150)
TSH REFLEX: 1.82 UIU/ML (ref 0.44–3.86)
WBC # BLD: 8.6 K/UL (ref 4.8–10.8)

## 2022-04-11 PROCEDURE — 99214 OFFICE O/P EST MOD 30 MIN: CPT | Performed by: NURSE PRACTITIONER

## 2022-04-11 RX ORDER — ATORVASTATIN CALCIUM 20 MG/1
20 TABLET, FILM COATED ORAL DAILY
Qty: 30 TABLET | Refills: 5 | Status: CANCELLED | OUTPATIENT
Start: 2022-04-11

## 2022-04-11 RX ORDER — MECLIZINE HYDROCHLORIDE 25 MG/1
25 TABLET ORAL 3 TIMES DAILY PRN
COMMUNITY
Start: 2021-09-25 | End: 2022-08-08 | Stop reason: ALTCHOICE

## 2022-04-11 SDOH — ECONOMIC STABILITY: FOOD INSECURITY: WITHIN THE PAST 12 MONTHS, YOU WORRIED THAT YOUR FOOD WOULD RUN OUT BEFORE YOU GOT MONEY TO BUY MORE.: NEVER TRUE

## 2022-04-11 SDOH — ECONOMIC STABILITY: FOOD INSECURITY: WITHIN THE PAST 12 MONTHS, THE FOOD YOU BOUGHT JUST DIDN'T LAST AND YOU DIDN'T HAVE MONEY TO GET MORE.: NEVER TRUE

## 2022-04-11 ASSESSMENT — PATIENT HEALTH QUESTIONNAIRE - PHQ9
SUM OF ALL RESPONSES TO PHQ QUESTIONS 1-9: 0
1. LITTLE INTEREST OR PLEASURE IN DOING THINGS: 0
SUM OF ALL RESPONSES TO PHQ9 QUESTIONS 1 & 2: 0
SUM OF ALL RESPONSES TO PHQ QUESTIONS 1-9: 0
2. FEELING DOWN, DEPRESSED OR HOPELESS: 0
SUM OF ALL RESPONSES TO PHQ QUESTIONS 1-9: 0
SUM OF ALL RESPONSES TO PHQ QUESTIONS 1-9: 0

## 2022-04-11 ASSESSMENT — ENCOUNTER SYMPTOMS
SHORTNESS OF BREATH: 0
DIARRHEA: 0
COUGH: 0
NAUSEA: 0

## 2022-04-11 ASSESSMENT — SOCIAL DETERMINANTS OF HEALTH (SDOH): HOW HARD IS IT FOR YOU TO PAY FOR THE VERY BASICS LIKE FOOD, HOUSING, MEDICAL CARE, AND HEATING?: NOT HARD AT ALL

## 2022-04-11 NOTE — PROGRESS NOTES
Subjective  Chief Complaint   Patient presents with    Hypertension     pt states BP check, medication refill. pt states she has felt fatigued lately        HPI     Had covid around the holidays. Struggling to bounce back from it. Notes that she is extremely fatigued on a regular basis. HTN follow up. Has been well controlled. Taking benicar as directed. No issues with medication or side effects    Would like to try and lose weight. Has tried adipex and contrave previously. Asking about adipex/topamax combo? D/c her lipitor. Was having hip pain as a side effect.     Patient Active Problem List    Diagnosis Date Noted    Abnormal ultrasound of breast 2018    Hx of Clostridium difficile infection 2021    Visual loss 10/23/2020    Chest discomfort 2019    Essential hypertension 2019    Family history of sudden cardiac death (SCD) 2019    Pure hypercholesterolemia 2019    Venous insufficiency of left lower extremity 04/15/2019    Asymptomatic telangiectasia 04/15/2019    Family history of anemia 11/15/2013    Vertigo 11/15/2013    Microcytic anemia 11/15/2013    Vitamin D deficiency 11/15/2013    Hypercoagulopathy ([address]      Past Medical History:   Diagnosis Date    Breast disorder     right anneurysm    Essential hypertension 2019    Hypercoagulopathy ([address]     Microcytic anemia 11/15/2013    Vertigo 11/15/2013    Vitamin D deficiency 11/15/2013     Past Surgical History:   Procedure Laterality Date     SECTION      X2    [facility]    EYE SURGERY Bilateral     lasik    HYSTEROSCOPY  2016    [facility] INCONTINENCE SURGERY      POLYPECTOMY  2016    [facility]    RI COLON CA SCRN NOT  [address] IND N/A 2017    [facility]       Family History   Problem Relation Age of Onset    Allergies Mother     Anemia Mother     Diabetes Father     Cancer Other     High Blood Pressure Other     Breast Cancer Neg Hx     Colon Cancer Neg Hx     Colon Polyps Neg Hx     Crohn's Disease Neg Hx      Social History     Socioeconomic History    Marital status:      Spouse name: None    Number of children: None    Years of education: None    Highest education level: None   Occupational History    None   Tobacco Use    Smoking status: Never Smoker    Smokeless tobacco: Never Used   Vaping Use    Vaping Use: Never used   Substance and Sexual Activity    Alcohol use: Yes     Comment: Ever Couple Months    Drug use: No    Sexual activity: Yes     Partners: Male   Other Topics Concern    None   Social History Narrative    None     Social Determinants of Health     Financial Resource Strain: Low Risk     Difficulty of Paying Living Expenses: Not hard at all   Food Insecurity: No Food Insecurity    Worried About Running Out of Food in the Last Year: Never true    Yaw of Food in the Last Year: Never true   Transportation Needs:     Lack of Transportation (Medical): Not on file    Lack of Transportation (Non-Medical):  Not on file   Physical Activity:     Days of Exercise per Week: Not on file    Minutes of Exercise per Session: Not on file   Stress:     Feeling of Stress : Not on file   Social Connections:     Frequency of Communication with Friends and Family: Not on file    Frequency of Social Gatherings with Friends and Family: Not on file    Attends Mu-ism Services: Not on file    Active Member of Clubs or Organizations: Not on file    Attends Club or Organization Meetings: Not on file    Marital Status: Not on file   Intimate Partner Violence:     Fear of Current or Ex-Partner: Not on file    Emotionally Abused: Not on file    Physically Abused: Not on file    Sexually Abused: Not on file   Housing Stability:     Unable to Pay for Housing in the Last Year: Not on file    Number of Places Lived in the Last Year: Not on file    Unstable Housing in the Last Year: Not on file     Current Outpatient Medications on File Prior to Visit   Medication Sig Dispense Refill    meclizine (ANTIVERT) 25 MG tablet Take 25 mg by mouth 3 times daily as needed      olmesartan (BENICAR) 5 MG tablet Take 1 tablet by mouth daily 90 tablet 3    polyethyl glycol-propyl glycol 0.4-0.3 % (SYSTANE) 0.4-0.3 % ophthalmic solution Place 1 drop into both eyes as needed for Dry Eyes 30 mL 0    Cholecalciferol (VITAMIN D) 50 MCG (2000 UT) CAPS capsule Take by mouth      Flaxseed, Linseed, (FLAXSEED OIL PO) Take by mouth      Multiple Vitamins-Minerals (MULTIVITAMIN ADULT PO) Take by mouth      aspirin 81 MG chewable tablet Take 81 mg by mouth daily      levonorgestrel (MIRENA, 52 MG,) IUD 52 mg 1 each by Intrauterine route once      atorvastatin (LIPITOR) 20 MG tablet  (Patient not taking: Reported on 4/11/2022)       No current facility-administered medications on file prior to visit. Allergies   Allergen Reactions    Lisinopril Other (See Comments)     COUGH  COUGH    Penicillins Hives       Review of Systems   Constitutional: Positive for fatigue. Respiratory: Negative for cough and shortness of breath. Cardiovascular: Negative for chest pain. Gastrointestinal: Negative for diarrhea and nausea. Musculoskeletal: Positive for arthralgias. Objective  Vitals:    04/11/22 0903   BP: 124/82   Pulse: 70   SpO2: 98%   Weight: 206 lb (93.4 kg)   Height: 5' 3\" (1.6 m)     Physical Exam  Vitals and nursing note reviewed. Constitutional:       Appearance: Normal appearance. She is obese. HENT:      Head: Normocephalic. Nose: Nose normal.      Mouth/Throat:      Mouth: Mucous membranes are moist.      Pharynx: Oropharynx is clear. Eyes:      Extraocular Movements: Extraocular movements intact.       Conjunctiva/sclera: Conjunctivae normal.      Pupils: Pupils are equal, round, and reactive to light. Cardiovascular:      Rate and Rhythm: Normal rate and regular rhythm. Pulses: Normal pulses. Heart sounds: Normal heart sounds. Pulmonary:      Effort: Pulmonary effort is normal.      Breath sounds: Normal breath sounds. Musculoskeletal:      Cervical back: Neck supple. Skin:     General: Skin is warm. Neurological:      General: No focal deficit present. Mental Status: She is alert and oriented to person, place, and time. Mental status is at baseline. Psychiatric:         Mood and Affect: Mood normal.         Behavior: Behavior normal.         Thought Content: Thought content normal.         Judgment: Judgment normal.       Assessment & Plan     Diagnosis Orders   1. Essential hypertension  Comprehensive Metabolic Panel   2. Borderline diabetes  Hemoglobin A1C   3. Fatigue, unspecified type  CBC with Auto Differential    TSH with Reflex   4. Pure hypercholesterolemia  Lipid Panel   5. Class 2 severe obesity due to excess calories with serious comorbidity and body mass index (BMI) of 36.0 to 36.9 in adult ([facility]  Phentermine-Topiramate 3.75-23 MG CP24       Orders Placed This Encounter   Procedures    Hemoglobin A1C     Standing Status:   Future     Standing Expiration Date:   4/11/2023    Comprehensive Metabolic Panel     Standing Status:   Future     Standing Expiration Date:   4/11/2023    Lipid Panel     Standing Status:   Future     Standing Expiration Date:   4/11/2023     Order Specific Question:   Is Patient Fasting?/# of Hours     Answer:   12    CBC with Auto Differential     Standing Status:   Future     Standing Expiration Date:   4/11/2023    TSH with Reflex     Standing Status:   Future     Standing Expiration Date:   4/11/2023       Orders Placed This Encounter   Medications    Phentermine-Topiramate 3.75-23 MG CP24     Sig: Take 1 capsule by mouth daily for 30 days.      Dispense:  30 capsule     Refill:  0     Side effects, adverse effects of the medication prescribed today, as well as treatment plan/ rationale and result expectations have been discussed with the patient who expresses understanding and desires to proceed. Close follow up to evaluate treatment results and for coordination of care. I have reviewed the patient's medical history in detail and updated the computerized patient record. As always, patient is advised that if symptoms worsen in any way they will proceed to the nearest emergency room. Fu in 1 mos.       Macy Loredo, WALLACE - CNP

## 2022-04-12 DIAGNOSIS — R74.8 ELEVATED ALKALINE PHOSPHATASE LEVEL: Primary | ICD-10-CM

## 2022-04-13 DIAGNOSIS — R74.8 ELEVATED ALKALINE PHOSPHATASE LEVEL: ICD-10-CM

## 2022-04-20 LAB
ALK PHOS OTHER CALC: 0 U/L
ALK PHOSPHATASE: 176 U/L (ref 40–120)
ALKALINE PHOSPHATASE BONE FRACTION: 49 U/L (ref 0–55)
ALKALINE PHOSPHATASE LIVER FRACTION: 127 U/L (ref 0–94)

## 2022-04-25 DIAGNOSIS — R74.8 ELEVATED ALKALINE PHOSPHATASE LEVEL: Primary | ICD-10-CM

## 2022-05-10 ENCOUNTER — OFFICE VISIT (OUTPATIENT)
Dept: FAMILY MEDICINE CLINIC | Age: 57
End: 2022-05-10
Payer: COMMERCIAL

## 2022-05-10 VITALS
WEIGHT: 197 LBS | HEART RATE: 75 BPM | BODY MASS INDEX: 34.91 KG/M2 | HEIGHT: 63 IN | SYSTOLIC BLOOD PRESSURE: 112 MMHG | DIASTOLIC BLOOD PRESSURE: 72 MMHG | OXYGEN SATURATION: 98 %

## 2022-05-10 DIAGNOSIS — I10 ESSENTIAL HYPERTENSION: Primary | ICD-10-CM

## 2022-05-10 DIAGNOSIS — E66.1 CLASS 1 DRUG-INDUCED OBESITY WITHOUT SERIOUS COMORBIDITY WITH BODY MASS INDEX (BMI) OF 34.0 TO 34.9 IN ADULT: ICD-10-CM

## 2022-05-10 PROCEDURE — 99213 OFFICE O/P EST LOW 20 MIN: CPT | Performed by: NURSE PRACTITIONER

## 2022-05-10 ASSESSMENT — ENCOUNTER SYMPTOMS
DIARRHEA: 0
CONSTIPATION: 0
COUGH: 0
SHORTNESS OF BREATH: 0

## 2022-05-10 NOTE — PROGRESS NOTES
Subjective  Chief Complaint   Patient presents with    Weight Loss     1 month f/u        HPI     Started phentermine-topamax for weight loss. Has noticed that she has been craving pop less. Lost 9 lbs in the past month. Tolerating well. No side effects with it. No other concerns. Planning on making an appt with GI to discuss chronically elevated alk phos in the near future.     Patient Active Problem List    Diagnosis Date Noted    Abnormal ultrasound of breast 2018    Hx of Clostridium difficile infection 2021    Visual loss 10/23/2020    Chest discomfort 2019    Essential hypertension 2019    Family history of sudden cardiac death (SCD) 2019    Pure hypercholesterolemia 2019    Venous insufficiency of left lower extremity 04/15/2019    Asymptomatic telangiectasia 04/15/2019    Family history of anemia 11/15/2013    Vertigo 11/15/2013    Microcytic anemia 11/15/2013    Vitamin D deficiency 11/15/2013    Hypercoagulopathy ([address]      Past Medical History:   Diagnosis Date    Breast disorder     right anneurysm    Essential hypertension 2019    Hypercoagulopathy ([address]     Microcytic anemia 11/15/2013    Vertigo 11/15/2013    Vitamin D deficiency 11/15/2013     Past Surgical History:   Procedure Laterality Date     SECTION      X2    [facility]    EYE SURGERY Bilateral     lasik    HYSTEROSCOPY  2016    [facility]CONT[facility]ENCE [facility]GERY      POLYPECTOMY  2016    [facility]    [place] COLON CA SCRN NOT  [address] IND N/A 2017    COL[facility]       Family History   Problem Relation Age of Onset    Allergies Mother     Anemia Mother     Diabetes Father     Cancer Other     High Blood Pressure Other     Breast Cancer Neg Hx     Colon Cancer Neg Hx     Colon Polyps Neg Hx     Crohn's Disease Neg Hx      Social History     Socioeconomic History    Marital status:      Spouse name: None    Number of children: None    Years of education: None    Highest education level: None   Occupational History    None   Tobacco Use    Smoking status: Never Smoker    Smokeless tobacco: Never Used   Vaping Use    Vaping Use: Never used   Substance and Sexual Activity    Alcohol use: Yes     Comment: Ever Couple Months    Drug use: No    Sexual activity: Yes     Partners: Male   Other Topics Concern    None   Social History Narrative    None     Social Determinants of Health     Financial Resource Strain: Low Risk     Difficulty of Paying Living Expenses: Not hard at all   Food Insecurity: No Food Insecurity    Worried About Running Out of Food in the Last Year: Never true    Yaw of Food in the Last Year: Never true   Transportation Needs:     Lack of Transportation (Medical): Not on file    Lack of Transportation (Non-Medical):  Not on file   Physical Activity:     Days of Exercise per Week: Not on file    Minutes of Exercise per Session: Not on file   Stress:     Feeling of Stress : Not on file   Social Connections:     Frequency of Communication with Friends and Family: Not on file    Frequency of Social Gatherings with Friends and Family: Not on file    Attends Caodaism Services: Not on file    Active Member of [address] [place] [address] [employer] or Organizations: Not on file    Attends Club or Organization Meetings: Not on file    Marital Status: Not on file   Intimate Partner Violence:     Fear of Current or Ex-Partner: Not on file    Emotionally Abused: Not on file    Physically Abused: Not on file    Sexually Abused: Not on file   Housing Stability:     Unable to Pay for Housing in the Last Year: Not on file    Number of [place] in the Last Year: Not on file    Unstable Housing in the Last Year: Not on file     Current Outpatient Medications on File Prior to Visit   Medication Sig Dispense Refill    meclizine (ANTIVERT) 25 MG tablet Take 25 mg by mouth 3 times daily as needed      olmesartan (BENICAR) 5 MG tablet Take 1 tablet by mouth daily 90 tablet 3    polyethyl glycol-propyl glycol 0.4-0.3 % (SYSTANE) 0.4-0.3 % ophthalmic solution Place 1 drop into both eyes as needed for Dry Eyes 30 mL 0    Cholecalciferol (VITAMIN D) 50 MCG (2000 UT) CAPS capsule Take by mouth      Flaxseed, Linseed, (FLAXSEED OIL PO) Take by mouth      Multiple Vitamins-Minerals (MULTIVITAMIN ADULT PO) Take by mouth      aspirin 81 MG chewable tablet Take 81 mg by mouth daily      atorvastatin (LIPITOR) 20 MG tablet       levonorgestrel (MIRENA, 52 MG,) IUD 52 mg 1 each by Intrauterine route once       No current facility-administered medications on file prior to visit. Allergies   Allergen Reactions    Lisinopril Other (See Comments)     COUGH  COUGH    Penicillins Hives       Review of Systems   Constitutional: Negative for fatigue. Respiratory: Negative for cough and shortness of breath. Cardiovascular: Negative for chest pain. Gastrointestinal: Negative for constipation and diarrhea. Objective  Vitals:    05/10/22 1540   BP: 112/72   Pulse: 75   SpO2: 98%   Weight: 197 lb (89.4 kg)   Height: 5' 3\" (1.6 m)     Physical Exam  Vitals and nursing note reviewed. Constitutional:       Appearance: Normal appearance. She is obese. HENT:      Head: Normocephalic. Mouth/Throat:      Mouth: Mucous membranes are moist.   Eyes:      Extraocular Movements: Extraocular movements intact. Conjunctiva/sclera: Conjunctivae normal.      Pupils: Pupils are equal, round, and reactive to light. Cardiovascular:      Rate and Rhythm: Normal rate and regular rhythm. Pulses: Normal pulses. Heart sounds: Normal heart sounds. Pulmonary:      Effort: Pulmonary effort is normal.      Breath sounds: Normal breath sounds. Musculoskeletal:      Cervical back: Neck supple.    Neurological: General: No focal deficit present. Mental Status: She is alert and oriented to person, place, and time. Mental status is at baseline. Psychiatric:         Mood and Affect: Mood normal.         Behavior: Behavior normal.         Thought Content: Thought content normal.         Judgment: Judgment normal.       Assessment & Plan     Diagnosis Orders   1. Essential hypertension     2. Class 1 drug-induced obesity without serious comorbidity with body mass index (BMI) of 34.0 to 34.9 in adult  Phentermine-Topiramate 7.5-46 MG CP24       No orders of the defined types were placed in this encounter. Orders Placed This Encounter   Medications    Phentermine-Topiramate 7.5-46 MG CP24     Sig: Take 1 capsule by mouth daily for 90 days. Dispense:  30 capsule     Refill:  2     Side effects, adverse effects of the medication prescribed today, as well as treatment plan/ rationale and result expectations have been discussed with the patient who expresses understanding and desires to proceed. Close follow up to evaluate treatment results and for coordination of care. I have reviewed the patient's medical history in detail and updated the computerized patient record. As always, patient is advised that if symptoms worsen in any way they will proceed to the nearest emergency room. Return in about 3 months (around 8/10/2022).     WALLACE Hernandez - CNP

## 2022-06-01 ENCOUNTER — OFFICE VISIT (OUTPATIENT)
Dept: GASTROENTEROLOGY | Age: 57
End: 2022-06-01
Payer: COMMERCIAL

## 2022-06-01 VITALS
BODY MASS INDEX: 34.72 KG/M2 | OXYGEN SATURATION: 96 % | RESPIRATION RATE: 12 BRPM | SYSTOLIC BLOOD PRESSURE: 114 MMHG | WEIGHT: 196 LBS | HEART RATE: 71 BPM | DIASTOLIC BLOOD PRESSURE: 72 MMHG

## 2022-06-01 DIAGNOSIS — R74.8 ABNORMAL ALKALINE PHOSPHATASE TEST: Primary | ICD-10-CM

## 2022-06-01 PROCEDURE — 99213 OFFICE O/P EST LOW 20 MIN: CPT | Performed by: INTERNAL MEDICINE

## 2022-06-01 ASSESSMENT — ENCOUNTER SYMPTOMS
DIARRHEA: 0
PHOTOPHOBIA: 0
RECTAL PAIN: 0
ABDOMINAL DISTENTION: 0
SHORTNESS OF BREATH: 0
WHEEZING: 0
TROUBLE SWALLOWING: 0
VOMITING: 0
EYE REDNESS: 0
CONSTIPATION: 0
EYE PAIN: 0
VOICE CHANGE: 0
ABDOMINAL PAIN: 0
BLOOD IN STOOL: 0
CHEST TIGHTNESS: 0
NAUSEA: 0
COLOR CHANGE: 0

## 2022-06-01 NOTE — PROGRESS NOTES
Subjective:      Patient ID: Berna Reyes is a 61 y.o. female who presents today for:  Chief Complaint   Patient presents with    New Patient     Elevated Alkaline Phosphatase levels        HPI  Patient came in as follow-up with new concerns of elevated alkaline phosphatase, review of records noted elevated alkaline phosphatase less than 2 times normal since 2013, isoenzymes noted hepatic origin, noted normal transaminases. Patient reports being told many years ago she had a fatty liver, reported  that alk phos was normal after significant wt loss in the past.  No history of EtOH or viral hepatitis. Reports maternal grandmother with liver cirrhosis, unknown etiology. Noted associated medical conditions including high cholesterol, hypertension, and class II obesity with BMI of 35. Pt reports a hx of venous and arterial thrombosis during pregnancy with multiple miscarriages, required anticoagulation during pregnancy, was told she had genetic markers of Lupus. Patient is asymptomatic, denies pruritus, or excessive fatigue. No liver related hospitalizations, memory loss or confusion, jaundice, easy bruising, nausea vomiting, hematemesis, melena, or hematochezia. Noted history of normal colonoscopy in 2017. Background  Patient had VV to establish GI care with complaints of a history of C. difficile, was diagnosed with C. difficile in early January was treated with vancomycin and she is now asymptomatic. C. difficile was contracted after clindamycin for dental work. Specifically wanted to discuss candidacy for fecal transplant and treatment options for C. difficile infection if it reoccurs. Discussed at length the pathophysiology, treatment options and fecal transplant with the patient.   Past Medical History:   Diagnosis Date    Breast disorder     right anneurysm    Essential hypertension 9/20/2019    [facility]     Microcytic anemia 11/15/2013    Vertigo 11/15/2013    Vitamin D deficiency 11/15/2013     Past Surgical History:   Procedure Laterality Date     SECTION      X2    [facility]    EYE SURGERY Bilateral     lasik    HYSTEROSCOPY  2016    [facility]CONT[facility]ENCE SURGERY      POL[facility]  2016    [facility]    MD COLON CA SCRN NOT  [address] IND N/A 2017    [facility] at [facility] History     Socioeconomic History    Marital status:      Spouse name: Not on file    Number of children: Not on file    Years of education: Not on file    Highest education level: Not on file   Occupational History    Not on file   Tobacco Use    Smoking status: Never Smoker    Smokeless tobacco: Never Used   Vaping Use    Vaping Use: Never used   Substance and Sexual Activity    Alcohol use: Yes     Comment: Ever Couple Months    Drug use: No    Sexual activity: Yes     Partners: Male   Other Topics Concern    Not on file   Social History Narrative    Not on file     Social Determinants of Health     Financial Resource Strain: Low Risk     Difficulty of Paying Living Expenses: Not hard at all   Food Insecurity: No Food Insecurity    Worried About [facility] in the Last Year: Never true    [facility] in the Last Year: Never true   Transportation Needs:     Lack of Transportation (Medical): Not on file    Lack of Transportation (Non-Medical):  Not on file   Physical Activity:     Days of Exercise per Week: Not on file    Minutes of Exercise per Session: Not on file   Stress:     Feeling of Stress : Not on file   Social Connections:     Frequency of Communication with Friends and Family: Not on file    Frequency of Social Gatherings with Friends and Family: Not on file    Attends Judaism Services: Not on file    Active Member of Clubs or Organizations: Not on file    Attends Club or Organization Meetings: Not on file    Marital Status: Not on file   Intimate Partner Violence:     Fear of Current or Ex-Partner: Not on file    Emotionally Abused: Not on file    Physically Abused: Not on file    Sexually Abused: Not on file   Housing Stability:     Unable to Pay for Housing in the Last Year: Not on file    Number of [place] in the Last Year: Not on file    Unstable Housing in the Last Year: Not on file     Family History   Problem Relation Age of Onset    Allergies Mother     Anemia Mother     Diabetes Father     Cancer Other     High Blood Pressure Other     Breast Cancer Neg Hx     Colon Cancer Neg Hx     Colon Polyps Neg Hx     Crohn's Disease Neg Hx      Allergies   Allergen Reactions    Lisinopril Other (See Comments)     COUGH  COUGH    Penicillins Hives         Review of Systems   Constitutional: Negative for appetite change, chills, fatigue, fever and unexpected weight change. HENT: Negative for nosebleeds, tinnitus, trouble swallowing and voice change. Eyes: Negative for photophobia, pain and redness. Respiratory: Negative for chest tightness, shortness of breath and wheezing. Cardiovascular: Negative for chest pain, palpitations and leg swelling. Gastrointestinal: Negative for abdominal distention, abdominal pain, blood in stool, constipation, diarrhea, nausea, rectal pain and vomiting. Endocrine: Negative for polydipsia, polyphagia and polyuria. Genitourinary: Negative for difficulty urinating and hematuria. Skin: Negative for color change, pallor and rash. Neurological: Negative for dizziness, speech difficulty and headaches. Psychiatric/Behavioral: Negative for confusion and suicidal ideas. Objective:   /72 (Site: Left Upper Arm, Position: Sitting, Cuff Size: Small Adult)   Pulse 71   Resp 12   Wt 196 lb (88.9 kg)   SpO2 96%   BMI 34.72 kg/m²     Physical Exam  Constitutional:       General: She is not in acute distress. Appearance: She is well-developed. HENT:      Head: Normocephalic and atraumatic. Eyes:      Conjunctiva/sclera: Conjunctivae normal.      Pupils: Pupils are equal, round, and reactive to light. Cardiovascular:      Rate and Rhythm: Normal rate and regular rhythm. Heart sounds: Normal heart sounds. Pulmonary:      Effort: Pulmonary effort is normal. No respiratory distress. Breath sounds: Normal breath sounds. No wheezing or rales. Abdominal:      General: Bowel sounds are normal. There is no distension. Palpations: Abdomen is soft. Abdomen is not rigid. There is no hepatomegaly, splenomegaly or mass. Tenderness: There is no abdominal tenderness. There is no guarding or rebound. Musculoskeletal:         General: No tenderness or deformity. Normal range of motion. Cervical back: Neck supple. Skin:     Coloration: Skin is not pale. Findings: No erythema or rash. Neurological:      Mental Status: She is alert and oriented to person, place, and time. Laboratory, Pathology, Radiology reviewed in detail with relevantimportant investigations summarized below:  Lab Results   Component Value Date    WBC 8.6 04/11/2022    WBC 8.2 10/06/2020    WBC 7.7 09/10/2019    WBC 8.1 04/05/2017    WBC 5.8 11/06/2013    HGB 11.9 04/11/2022    HGB 12.9 10/06/2020    HGB 13.1 09/10/2019    HGB 13.8 04/05/2017    HGB 10.3 11/06/2013    HCT 37.9 04/11/2022    HCT 40.3 10/06/2020    HCT 41.0 09/10/2019    HCT 41.7 04/05/2017    HCT 31.9 11/06/2013    MCV 88.4 04/11/2022    MCV 88.0 10/06/2020    MCV 90.1 09/10/2019    MCV 88.5 04/05/2017    MCV 77.8 11/06/2013     04/11/2022     10/06/2020     09/10/2019     04/05/2017     11/06/2013    .   Lab Results   Component Value Date    ALT 15 04/11/2022    ALT 16 08/20/2021    ALT 13 10/06/2020    AST 33 04/11/2022    AST 28 08/20/2021    AST 23 10/06/2020    ALKPHOS 176 04/13/2022    ALKPHOS 179 04/11/2022 ALKPHOS 197 08/20/2021    ALKPHOS 164 10/06/2020    BILITOT <0.2 04/11/2022    BILITOT 0.4 08/20/2021    BILITOT 0.3 10/06/2020       No results found. Lab Results   Component Value Date    IRON 50 10/06/2020    IRON 42 09/10/2019    IRON 58 04/05/2017    IRON 20 11/15/2013    TIBC 353 10/06/2020    TIBC 321 09/10/2019    TIBC 339 04/05/2017    TIBC 400 11/15/2013     No results found for: INR  No components found for: ACUTEHEPATITISSCREEN  No components found for: CELIACPANEL  No components found for: STOOLCULTURE, C.DIFF, STOOLOVAPARASITE, STOOLLEUCOCYTE        Assessment:       Diagnosis Orders   1. Abnormal alkaline phosphatase test  US ABDOMEN LIMITED    US FIBROSCAN    Alpha-1-Antitrypsin w Phenotype    YULY    Anti-smooth muscle antibody    Comprehensive Metabolic Panel    CBC    Ferritin    Hepatitis A Antibody, Total    Hepatitis A Antibody, IgM    Hepatitis B surface antibody    Hepatitis B Surface Antigen    Hepatitis B Core Antibody, Total    Immunoglobulins, Quantitative    MITOCHONDRIAL ANTIBODIES, M2, IGG    Iron and TIBC         Plan:     1. Elevated alk phos  elevated alkaline phosphatase less than 2 times normal since 2013, isoenzymes noted hepatic origin, noted normal transaminases. Patient is asymptomatic, denies pruritus, or excessive fatigue. Patient reports being told many years ago she had a fatty liver, reported that alk phos was normal after significant wt loss in the past.    No history of EtOH or viral hepatitis. Reports maternal grandmother with liver cirrhosis, unknown etiology. Noted associated medical conditions including high cholesterol, hypertension, and class II obesity with BMI of 35. Pt reports a hx of venous and arterial thrombosis during pregnancy with multiple miscarriages, required anticoagulation during pregnancy, was told she had genetic markers of Lupus, ?  Antiphospholipid sydrome   -obtain US to evaluate extrahepatic cholestasis  -Obtain blood work to rule out any associated viral, autoimmune or metabolic etiologies  2- Chronic liver disease staging:  No clinical or lab data suggestive of advanced liver disease   Obtain fibroscan for fibrosis staging and assessment  3. Associated medical conditions include but are not limited too. .. Hypertension, high cholesterol, class II obesity with BMI of 35, vertigo, lupus vs ? antiphospholipid syndrome      Return in about 4 weeks (around 6/29/2022), or if symptoms worsen or fail to improve.     Derick Encinas MD

## 2022-06-02 DIAGNOSIS — R74.8 ABNORMAL ALKALINE PHOSPHATASE TEST: ICD-10-CM

## 2022-06-02 LAB
ALBUMIN SERPL-MCNC: 4.2 G/DL (ref 3.5–4.6)
ALP BLD-CCNC: 168 U/L (ref 40–130)
ALT SERPL-CCNC: 19 U/L (ref 0–33)
ANION GAP SERPL CALCULATED.3IONS-SCNC: 10 MEQ/L (ref 9–15)
AST SERPL-CCNC: 30 U/L (ref 0–35)
BILIRUB SERPL-MCNC: 0.3 MG/DL (ref 0.2–0.7)
BUN BLDV-MCNC: 10 MG/DL (ref 6–20)
CALCIUM SERPL-MCNC: 9.1 MG/DL (ref 8.5–9.9)
CHLORIDE BLD-SCNC: 106 MEQ/L (ref 95–107)
CO2: 25 MEQ/L (ref 20–31)
CREAT SERPL-MCNC: 0.81 MG/DL (ref 0.5–0.9)
GFR AFRICAN AMERICAN: >60
GFR NON-AFRICAN AMERICAN: >60
GLOBULIN: 3.2 G/DL (ref 2.3–3.5)
GLUCOSE BLD-MCNC: 91 MG/DL (ref 70–99)
HCT VFR BLD CALC: 35.2 % (ref 37–47)
HEMOGLOBIN: 11.5 G/DL (ref 12–16)
HEPATITIS B SURFACE ANTIGEN INTERPRETATION: NORMAL
MCH RBC QN AUTO: 28.5 PG (ref 27–31.3)
MCHC RBC AUTO-ENTMCNC: 32.7 % (ref 33–37)
MCV RBC AUTO: 86.9 FL (ref 82–100)
PDW BLD-RTO: 14.3 % (ref 11.5–14.5)
PLATELET # BLD: 275 K/UL (ref 130–400)
POTASSIUM SERPL-SCNC: 4.1 MEQ/L (ref 3.4–4.9)
RBC # BLD: 4.05 M/UL (ref 4.2–5.4)
SODIUM BLD-SCNC: 141 MEQ/L (ref 135–144)
TOTAL PROTEIN: 7.4 G/DL (ref 6.3–8)
WBC # BLD: 7.7 K/UL (ref 4.8–10.8)

## 2022-06-03 LAB
FERRITIN: 26 NG/ML (ref 13–150)
HAV IGM SER IA-ACNC: NONREACTIVE
HBV SURFACE AB TITR SER: <3.5 MIU/ML
IRON SATURATION: 11 % (ref 20–55)
IRON: 37 UG/DL (ref 37–145)
TOTAL IRON BINDING CAPACITY: 334 UG/DL (ref 250–450)
UNSATURATED IRON BINDING CAPACITY: 297 UG/DL (ref 112–347)

## 2022-06-04 LAB
HAV AB SERPL IA-ACNC: NEGATIVE
HEPATITIS B CORE TOTAL ANTIBODY: NEGATIVE
IGA: 619 MG/DL (ref 68–408)
IGG: 1024 MG/DL (ref 768–1632)
IGM: 67 MG/DL (ref 35–263)
MITOCHONDRIAL M2 AB, IGG: 1 U/ML (ref 0–4)

## 2022-06-06 LAB
ANA PATTERN: ABNORMAL
ANA TITER: ABNORMAL
ANTINUCLEAR AB INTERPRETIVE COMMENT: ABNORMAL
ANTINUCLEAR ANTIBODY, HEP-2, IGG: DETECTED

## 2022-06-07 LAB — F-ACTIN AB IGA: 50.7 UNITS (ref 0–24.9)

## 2022-06-15 ENCOUNTER — HOSPITAL ENCOUNTER (OUTPATIENT)
Dept: ULTRASOUND IMAGING | Age: 57
Discharge: HOME | End: 2022-06-17
Payer: COMMERCIAL

## 2022-06-15 DIAGNOSIS — R74.8 ABNORMAL ALKALINE PHOSPHATASE TEST: ICD-10-CM

## 2022-06-15 PROCEDURE — 76705 ECHO EXAM OF ABDOMEN: CPT

## 2022-06-16 ENCOUNTER — ANCILLARY PROCEDURE (OUTPATIENT)
Dept: ENDOSCOPY | Age: 57
End: 2022-06-16
Payer: COMMERCIAL

## 2022-06-16 DIAGNOSIS — R74.8 ABNORMAL ALKALINE PHOSPHATASE TEST: ICD-10-CM

## 2022-06-16 PROCEDURE — 91200 LIVER ELASTOGRAPHY: CPT

## 2022-06-16 PROCEDURE — 91200 LIVER ELASTOGRAPHY: CPT | Performed by: INTERNAL MEDICINE

## 2022-06-19 LAB
ALPHA-1 ANTITRYPSIN PHENOTYPE: NORMAL
ALPHA-1 ANTITRYPSIN: 154 MG/DL (ref 90–200)

## 2022-06-29 ENCOUNTER — OFFICE VISIT (OUTPATIENT)
Dept: GASTROENTEROLOGY | Age: 57
End: 2022-06-29
Payer: COMMERCIAL

## 2022-06-29 VITALS
BODY MASS INDEX: 34.72 KG/M2 | SYSTOLIC BLOOD PRESSURE: 112 MMHG | HEART RATE: 65 BPM | DIASTOLIC BLOOD PRESSURE: 70 MMHG | WEIGHT: 196 LBS | OXYGEN SATURATION: 97 %

## 2022-06-29 DIAGNOSIS — R74.8 ELEVATED ALKALINE PHOSPHATASE LEVEL: Primary | ICD-10-CM

## 2022-06-29 PROCEDURE — 99213 OFFICE O/P EST LOW 20 MIN: CPT | Performed by: NURSE PRACTITIONER

## 2022-06-29 ASSESSMENT — ENCOUNTER SYMPTOMS
ABDOMINAL PAIN: 0
ANAL BLEEDING: 0
EYE REDNESS: 0
COLOR CHANGE: 0
ABDOMINAL DISTENTION: 0
NAUSEA: 0
TROUBLE SWALLOWING: 0
SHORTNESS OF BREATH: 0
DIARRHEA: 0
BLOOD IN STOOL: 0
RECTAL PAIN: 0
PHOTOPHOBIA: 0
EYE PAIN: 0
CHEST TIGHTNESS: 0
VOICE CHANGE: 0
WHEEZING: 0
VOMITING: 0
CONSTIPATION: 0

## 2022-06-29 NOTE — PROGRESS NOTES
Subjective:      Patient ID: Mitchel Olvera is a 61 y.o. female who presents today for:  Chief Complaint   Patient presents with    Follow-up       HPI   Patient came in as follow-up and further evaluation of elevated alkaline phosphatase, had extensive work-up, ultrasound of the abdomen ruled out extrahepatic cholestasis, had FibroScan which showed no advanced fibrosis but moderate steatosis, noted positive YULY and smooth muscle antibody, negative IgG and antimitochondrial antibody. Patient has a history of autoimmune positive markers with genetic markers for lupus. Otherwise viral hepatitis studies were negative. No history of EtOH. Patient has associated medical conditions including class II obesity with BMI of 35, high cholesterol. Otherwise she is asymptomatic denies pruritus or excessive fatigue, no liver related hospitalizations, memory loss or confusion, jaundice, easy bruising constant nausea vomiting, hematemesis, melena, or hematochezia. OV 6/1/22  Patient came in as follow-up with new concerns of elevated alkaline phosphatase, review of records noted elevated alkaline phosphatase less than 2 times normal since 2013, isoenzymes noted hepatic origin, noted normal transaminases. Patient reports being told many years ago she had a fatty liver, reported  that alk phos was normal after significant wt loss in the past.  No history of EtOH or viral hepatitis. Reports maternal grandmother with liver cirrhosis, unknown etiology. Noted associated medical conditions including high cholesterol, hypertension, and class II obesity with BMI of 35. Pt reports a hx of venous and arterial thrombosis during pregnancy with multiple miscarriages, required anticoagulation during pregnancy, was told she had genetic markers of Lupus. Patient is asymptomatic, denies pruritus, or excessive fatigue.   No liver related hospitalizations, memory loss or confusion, jaundice, easy bruising, nausea vomiting, hematemesis, melena, or hematochezia. Noted history of normal colonoscopy in 2017. Background  Patient had VV to establish GI care with complaints of a history of C. difficile, was diagnosed with C. difficile in early January was treated with vancomycin and she is now asymptomatic.  C. difficile was contracted after clindamycin for dental work.  Specifically wanted to discuss candidacy for fecal transplant and treatment options for C. difficile infection if it reoccurs.  Discussed at length the pathophysiology, treatment options and fecal transplant with the patient.     Past Medical History:   Diagnosis Date    Breast disorder     right anneurysm    Essential hypertension 2019    Hypercoagulopathy ([address]     Microcytic anemia 11/15/2013    Vertigo 11/15/2013    Vitamin D deficiency 11/15/2013     Past Surgical History:   Procedure Laterality Date     SECTION      X2    [facility]  2016    [facility]    EYE SURGERY Bilateral     lasik    [facility]OSCOPY  2016    [facility] INCONTINENCE SURGERY      POLYPECTOMY  2016    [facility]    [place] COLON [place] SCRN NOT  [address] IND N/A 2017    COLONOSCOPY performed by [facility]ann[facility] at [facility] History     Socioeconomic History    Marital status:      Spouse name: Not on file    Number of children: Not on file    Years of education: Not on file    Highest education level: Not on file   Occupational History    Not on file   Tobacco Use    Smoking status: Never Smoker    Smokeless tobacco: Never Used   Vaping Use    Vaping Use: Never used   Substance and Sexual Activity    Alcohol use: Yes     Comment: Ever Couple Months    Drug use: No    Sexual activity: Yes     Partners: Male   Other Topics Concern    Not on file   Social History Narrative    Not on file     Social Determinants of Health     Financial Resource Strain: Low Risk     Difficulty of Paying Living Expenses: Not hard at all   Food Insecurity: No Food Insecurity    Worried About Running Out of Food in the Last Year: Never true    Ran Out of Food in the Last Year: Never true   Transportation Needs:     Lack of Transportation (Medical): Not on file    Lack of Transportation (Non-Medical): Not on file   Physical Activity:     Days of Exercise per Week: Not on file    Minutes of Exercise per Session: Not on file   Stress:     Feeling of Stress : Not on file   Social Connections:     Frequency of Communication with Friends and Family: Not on file    Frequency of Social Gatherings with Friends and Family: Not on file    Attends Buddhism Services: Not on file    Active Member of [address] [place] [address] [employer] or Organizations: Not on file    Attends Club or Organization Meetings: Not on file    Marital Status: Not on file   Intimate Partner Violence:     Fear of Current or Ex-Partner: Not on file    Emotionally Abused: Not on file    Physically Abused: Not on file    Sexually Abused: Not on file   Housing Stability:     Unable to Pay for Housing in the Last Year: Not on file    Number of [place] in the Last Year: Not on file    Unstable Housing in the Last Year: Not on file     Family History   Problem Relation Age of Onset    Allergies Mother     Anemia Mother     Diabetes Father     Cancer Other     High Blood Pressure Other     Breast Cancer Neg Hx     Colon Cancer Neg Hx     Colon Polyps Neg Hx     Crohn's Disease Neg Hx      Allergies   Allergen Reactions    Lisinopril Other (See Comments)     COUGH  COUGH    Penicillins Hives         Review of Systems   Constitutional: Negative for appetite change, chills, fever and unexpected weight change. HENT: Negative for nosebleeds, tinnitus, trouble swallowing and voice change. Eyes: Negative for photophobia, pain and redness. Respiratory: Negative for chest tightness, shortness of breath and wheezing.     Cardiovascular: Negative for chest pain, palpitations and leg swelling. Gastrointestinal: Negative for abdominal distention, abdominal pain, anal bleeding, blood in stool, constipation, diarrhea, nausea, rectal pain and vomiting. Endocrine: Negative for polydipsia, polyphagia and polyuria. Genitourinary: Negative for difficulty urinating and hematuria. Skin: Negative for color change, pallor and rash. Neurological: Negative for dizziness, speech difficulty and headaches. Psychiatric/Behavioral: Negative for confusion and suicidal ideas. Objective:   /70 (Site: Right Upper Arm, Position: Sitting, Cuff Size: Small Adult)   Pulse 65   Wt 196 lb (88.9 kg)   SpO2 97%   BMI 34.72 kg/m²     Physical Exam  Vitals reviewed. Constitutional:       General: She is not in acute distress. Appearance: Normal appearance. She is well-developed and well-groomed. HENT:      Head: Normocephalic and atraumatic. Nose: Nose normal.   Eyes:      General: No scleral icterus. Extraocular Movements: Extraocular movements intact. Conjunctiva/sclera: Conjunctivae normal.      Pupils: Pupils are equal, round, and reactive to light. Cardiovascular:      Rate and Rhythm: Normal rate and regular rhythm. Pulses: Normal pulses. Heart sounds: Normal heart sounds. Pulmonary:      Effort: Pulmonary effort is normal. No respiratory distress. Breath sounds: Normal breath sounds. No wheezing or rales. Abdominal:      General: Abdomen is flat. Bowel sounds are normal. There is no distension. Palpations: Abdomen is soft. There is no hepatomegaly, splenomegaly or mass. Tenderness: There is no abdominal tenderness. There is no guarding or rebound. Musculoskeletal:         General: No tenderness or deformity. Normal range of motion. Cervical back: Neck supple. Right lower leg: No edema. Left lower leg: No edema. Skin:     General: Skin is warm and dry.       Capillary Refill: Capillary refill takes less than 2 seconds. Coloration: Skin is not jaundiced. Findings: No erythema or rash. Neurological:      General: No focal deficit present. Mental Status: She is alert and oriented to person, place, and time. Psychiatric:         Mood and Affect: Mood normal.         Behavior: Behavior normal.         Laboratory, Pathology, Radiology reviewed in detail with relevantimportant investigations summarized below:  Lab Results   Component Value Date    WBC 7.7 06/02/2022    WBC 8.6 04/11/2022    WBC 8.2 10/06/2020    WBC 7.7 09/10/2019    WBC 8.1 04/05/2017    HGB 11.5 06/02/2022    HGB 11.9 04/11/2022    HGB 12.9 10/06/2020    HGB 13.1 09/10/2019    HGB 13.8 04/05/2017    HCT 35.2 06/02/2022    HCT 37.9 04/11/2022    HCT 40.3 10/06/2020    HCT 41.0 09/10/2019    HCT 41.7 04/05/2017    MCV 86.9 06/02/2022    MCV 88.4 04/11/2022    MCV 88.0 10/06/2020    MCV 90.1 09/10/2019    MCV 88.5 04/05/2017     06/02/2022     04/11/2022     10/06/2020     09/10/2019     04/05/2017    . Lab Results   Component Value Date    ALT 19 06/02/2022    ALT 15 04/11/2022    ALT 16 08/20/2021    AST 30 06/02/2022    AST 33 04/11/2022    AST 28 08/20/2021    ALKPHOS 168 06/02/2022    ALKPHOS 176 04/13/2022    ALKPHOS 179 04/11/2022    ALKPHOS 197 08/20/2021    BILITOT 0.3 06/02/2022    BILITOT <0.2 04/11/2022    BILITOT 0.4 08/20/2021       US ABDOMEN LIMITED Specify organ? LIVER    Result Date: 6/15/2022  EXAMINATION: US ABDOMEN LIMITED CLINICAL HISTORY: ABNORMAL ALKALINE PHOSPHATASE FINDINGS: Liver normal in size and shape and increased in echogenicity. No intrahepatic and extrahepatic ductal dilatation. Common duct measures 2 mm. Gallbladder contains shadowing and no echogenic foci. No para cholecystic fluid. No gallbladder wall thickening. Distal pancreatic head and proximal pancreatic tail normal in size shape and increased in echogenicity.  Remainder of pancreas obscured by overlying bowel gas. HEPATIC STEATOSIS. FATTY INFILTRATION OF PANCREAS. US FIBROSCAN    Result Date: 2022  Velocity Controlled Transient Elastography (Fibroscan)  : Joanne Acuña Attending:  Nikia Norton MD   Patient was identified via name and . Rhoda Moya presents to endoscopy suite for VCTE.   Referring Physician:  Dr. Cortes Jones Diagnosis:  Abnormal alkaline phosphatase test   Probe Used:  XL   Pre-procedure Checklist:   Presence of ascites:  No Fasting for at least two hours: Yes Alcohol Use:  No History of heart failure: No   Findings:   Median kPa:  3.9 IQR/med (%):  46   Controlled Attenuation Paramater (CAP) Median (dB/m):  299 IQR (%):  39   Interpretation: Suboptimal reading, Based on LSM of 3.9 Kpa, NO evidence of advanced fibrosis noted Fibrosis stage 0-I ( F0-1) Based on CAP of 299 db/M, Mild to moderate steatosis (S1-2). Clinical correlation indicated Nikia Norton MD [facility]    Lab Results   Component Value Date    IRON 37 2022    IRON 50 10/06/2020    IRON 42 09/10/2019    IRON 58 2017    IRON 20 11/15/2013    TIBC 334 2022    TIBC 353 10/06/2020    TIBC 321 09/10/2019    TIBC 339 2017    TIBC 400 11/15/2013    FERRITIN 26 2022     No results found for: INR  No components found for: ACUTEHEPATITISSCREEN  No components found for: CELIACPANEL  No components found for: STOOLCULTURE, C.DIFF, STOOLOVAPARASITE, STOOLLEUCOCYTE    Findings:       Median kPa:  3.9   IQR/med (%):  46       Controlled Attenuation Paramater (CAP)   Median (dB/m):  299   IQR (%):  39       Interpretation:   Suboptimal reading, Based on LSM of 3.9 Kpa, NO evidence of advanced    fibrosis noted   Fibrosis stage 0-I ( F0-1)   Based on CAP of 299 db/M, Mild to moderate steatosis (S1-2). Clinical correlation indicated        Nikia Norton MD   [facility]         Assessment:       Diagnosis Orders   1. Elevated alkaline phosphatase level           Plan:     1.  Elevated alk phos  elevated alkaline phosphatase less than 2 times normal since 2013, isoenzymes noted hepatic origin, noted normal transaminases. Patient is asymptomatic, denies pruritus, or excessive fatigue. Ultrasound ruled out extrahepatic cholestasis, FibroScan showed no advanced fibrosis. No history of EtOH, viral hepatitis are negative  Noted positive YULY with elevated smooth muscle antibody, negative IgG and antimitochondrial antibody, could be indicative of autoimmune hepatitis with component of [place], noted history of positive genetic markers for lupus and questionable antiphospholipid syndrome. Noted associated medical conditions including high cholesterol, hypertension, and class II obesity with BMI of 35. Discussed proceeding with liver biopsy for definitive diagnosis and staging purposes, patient is undecided at this time she will call the office when she makes a decision.  -Obtain serial blood work in 3 months  -Proceed with liver biopsy when patient consents   2- Chronic liver disease staging:  No clinical or lab data suggestive of advanced liver disease   FibroScan showed no advanced fibrosis but mild to moderate steatosis  3. Associated medical conditions include but are not limited too. .. Hypertension, high cholesterol, class II obesity with BMI of 35, vertigo, lupus vs ? antiphospholipid syndrome        Return in about 3 months (around 9/29/2022), or if symptoms worsen or fail to improve.       Juancarlos Henry, APRN - CNP

## 2022-08-08 ENCOUNTER — OFFICE VISIT (OUTPATIENT)
Dept: FAMILY MEDICINE CLINIC | Age: 57
End: 2022-08-08
Payer: COMMERCIAL

## 2022-08-08 VITALS
HEART RATE: 59 BPM | TEMPERATURE: 98.2 F | HEIGHT: 63 IN | DIASTOLIC BLOOD PRESSURE: 76 MMHG | BODY MASS INDEX: 32.96 KG/M2 | OXYGEN SATURATION: 97 % | SYSTOLIC BLOOD PRESSURE: 110 MMHG | WEIGHT: 186 LBS

## 2022-08-08 DIAGNOSIS — L98.8 SKIN PLAQUE: ICD-10-CM

## 2022-08-08 DIAGNOSIS — E78.00 PURE HYPERCHOLESTEROLEMIA: ICD-10-CM

## 2022-08-08 DIAGNOSIS — I10 ESSENTIAL HYPERTENSION: Primary | ICD-10-CM

## 2022-08-08 DIAGNOSIS — E66.09 CLASS 1 OBESITY DUE TO EXCESS CALORIES WITH BODY MASS INDEX (BMI) OF 32.0 TO 32.9 IN ADULT, UNSPECIFIED WHETHER SERIOUS COMORBIDITY PRESENT: ICD-10-CM

## 2022-08-08 PROCEDURE — 99213 OFFICE O/P EST LOW 20 MIN: CPT | Performed by: NURSE PRACTITIONER

## 2022-08-08 ASSESSMENT — ENCOUNTER SYMPTOMS
COUGH: 0
SHORTNESS OF BREATH: 0

## 2022-08-08 NOTE — PROGRESS NOTES
Subjective  Chief Complaint   Patient presents with    3 Month Follow-Up    Obesity       HPI    10 LBS last 6 weeks with phentermine-topiramate. No side effects with the medication dose. Sleeping ok  Has been walking about 5 mi/day. Smaller size of clothes     Frustrated with the speed in which she is losing weight but overall happy that it has been going down. Has noticed she is not as hungry. Trying to eat low carb diet. High protein in the am    Pt also has a hypopigmented, scaly lesion on right inner thigh she wanted to know about.  States that it has changed minimally    Patient Active Problem List    Diagnosis Date Noted    Elevated alkaline phosphatase level 2022    Abnormal ultrasound of breast 2018    Hx of Clostridium difficile infection 2021    Visual loss 10/23/2020    Chest discomfort 2019    Essential hypertension 2019    Family history of sudden cardiac death (SCD) 2019    Pure hypercholesterolemia 2019    Venous insufficiency of left lower extremity 04/15/2019    Asymptomatic telangiectasia 04/15/2019    Family history of anemia 11/15/2013    Vertigo 11/15/2013    Microcytic anemia 11/15/2013    Vitamin D deficiency 11/15/2013    Hypercoagulopathy ([address]      Past Medical History:   Diagnosis Date    Breast disorder     right anneurysm    Essential hypertension 2019    Hypercoagulopathy ([address]     Microcytic anemia 11/15/2013    Vertigo 11/15/2013    Vitamin D deficiency 11/15/2013     Past Surgical History:   Procedure Laterality Date     SECTION      X2    DILATION AND CURETTAGE  2016    [facility]    EYE SURGERY Bilateral     lasik    HYSTEROSCOPY  2016    [facility]    INCONTINENCE SURGERY      POLYPECTOMY  2016    [facility]    [place] NOT  [address] IND N/A 2017    COLONOSCOPY performed by Balaji Maldonado MD at [address] [place] [address]       Family History   Problem Relation Age of Onset    Allergies Mother     Anemia Mother     Diabetes Father     Cancer Other     High Blood Pressure Other     Breast Cancer Neg Hx     Colon Cancer Neg Hx     Colon Polyps Neg Hx     Crohn's Disease Neg Hx      Social History     Socioeconomic History    Marital status:      Spouse name: None    Number of children: None    Years of education: None    Highest education level: None   Tobacco Use    Smoking status: Never    Smokeless tobacco: Never   Vaping Use    Vaping Use: Never used   Substance and Sexual Activity    Alcohol use: Yes     Comment: Ever Couple Months    Drug use: No    Sexual activity: Yes     Partners: Male     Social Determinants of Health     Financial Resource Strain: Low Risk     Difficulty of Paying Living Expenses: Not hard at all   Food Insecurity: No Food Insecurity    Worried About Running Out of Food in the Last Year: Never true    Ran Out of Food in the Last Year: Never true     Current Outpatient Medications on File Prior to Visit   Medication Sig Dispense Refill    rosuvastatin (CRESTOR) 10 MG tablet Take 1 tablet by mouth nightly 30 tablet 5    olmesartan (BENICAR) 5 MG tablet Take 1 tablet by mouth daily 90 tablet 3    polyethyl glycol-propyl glycol 0.4-0.3 % (SYSTANE) 0.4-0.3 % ophthalmic solution Place 1 drop into both eyes as needed for Dry Eyes 30 mL 0    Cholecalciferol (VITAMIN D) 50 MCG (2000 UT) CAPS capsule Take by mouth      Flaxseed, Linseed, (FLAXSEED OIL PO) Take by mouth      Multiple Vitamins-Minerals (MULTIVITAMIN ADULT PO) Take by mouth      aspirin 81 MG chewable tablet Take 81 mg by mouth daily      levonorgestrel (MIRENA, 52 MG,) IUD 52 mg 1 each by Intrauterine route once       No current facility-administered medications on file prior to visit. Allergies   Allergen Reactions    Lisinopril Other (See Comments)     COUGH  COUGH    Penicillins Hives       Review of Systems   Constitutional:  Negative for fatigue.    Respiratory: Negative for cough and shortness of breath. Cardiovascular:  Negative for chest pain. Psychiatric/Behavioral:  Negative for sleep disturbance. The patient is not nervous/anxious. Objective  Vitals:    08/08/22 0820   BP: 110/76   Pulse: 59   Temp: 98.2 °F (36.8 °C)   SpO2: 97%   Weight: 186 lb (84.4 kg)   Height: 5' 3\" (1.6 m)     Physical Exam  Vitals and nursing note reviewed. Constitutional:       Appearance: Normal appearance. HENT:      Head: Normocephalic. Nose: Nose normal.      Mouth/Throat:      Mouth: Mucous membranes are moist.      Pharynx: Oropharynx is clear. Eyes:      Extraocular Movements: Extraocular movements intact. Conjunctiva/sclera: Conjunctivae normal.      Pupils: Pupils are equal, round, and reactive to light. Cardiovascular:      Rate and Rhythm: Normal rate and regular rhythm. Pulses: Normal pulses. Heart sounds: Normal heart sounds. Pulmonary:      Effort: Pulmonary effort is normal.      Breath sounds: Normal breath sounds. Musculoskeletal:      Cervical back: Neck supple. Skin:     General: Skin is warm. Neurological:      General: No focal deficit present. Mental Status: She is alert and oriented to person, place, and time. Mental status is at baseline. Psychiatric:         Mood and Affect: Mood normal.         Behavior: Behavior normal.         Thought Content: Thought content normal.         Judgment: Judgment normal.       Assessment & Plan     Diagnosis Orders   1. Essential hypertension        2. Class 1 obesity due to excess calories with body mass index (BMI) of 32.0 to 32.9 in adult, unspecified whether serious comorbidity present  Phentermine-Topiramate 7.5-46 MG CP24  Is doing well. Will continue with current medication/dose. 3. Pure hypercholesterolemia        4. Skin plaque  Will fu with Dr. Gertrude Rodriguez for potential biopsy          No orders of the defined types were placed in this encounter.       Orders Placed

## 2022-09-29 ENCOUNTER — OFFICE VISIT (OUTPATIENT)
Dept: GASTROENTEROLOGY | Age: 57
End: 2022-09-29
Payer: COMMERCIAL

## 2022-09-29 VITALS
HEART RATE: 64 BPM | BODY MASS INDEX: 31.71 KG/M2 | DIASTOLIC BLOOD PRESSURE: 70 MMHG | WEIGHT: 179 LBS | OXYGEN SATURATION: 98 % | SYSTOLIC BLOOD PRESSURE: 112 MMHG

## 2022-09-29 DIAGNOSIS — R74.8 ABNORMAL ALKALINE PHOSPHATASE TEST: Primary | ICD-10-CM

## 2022-09-29 PROCEDURE — 99213 OFFICE O/P EST LOW 20 MIN: CPT | Performed by: NURSE PRACTITIONER

## 2022-09-29 ASSESSMENT — ENCOUNTER SYMPTOMS
COLOR CHANGE: 0
CHEST TIGHTNESS: 0
SHORTNESS OF BREATH: 0
PHOTOPHOBIA: 0
NAUSEA: 0
RECTAL PAIN: 0
EYE REDNESS: 0
BLOOD IN STOOL: 0
VOICE CHANGE: 0
DIARRHEA: 0
EYE PAIN: 0
VOMITING: 0
ABDOMINAL DISTENTION: 0
WHEEZING: 0
TROUBLE SWALLOWING: 0
ANAL BLEEDING: 0
CONSTIPATION: 0
ABDOMINAL PAIN: 0

## 2022-09-29 NOTE — PROGRESS NOTES
Subjective:      Patient ID: Rosita Powell is a 61 y.o. female who presents today for:  Chief Complaint   Patient presents with    Follow-up       HPI  Patient came in as follow-up and further evaluation of elevated alkaline phosphatase, noted history of positive YULY and anti-smooth muscle antibody with negative IgG and antimitochondrial antibody, had FibroScan which showed no advanced fibrosis but moderate steatosis and ultrasound that ruled out extrahepatic cholestasis. Patient does have a history of autoimmune positive markers with genetic markers for lupus. Otherwise viral hepatitis was negative, no history of EtOH, patient does have associated medical conditions including class II obesity with BMI of 31 has lost 30 pounds since her initial diagnosis, and hyperlipidemia. Otherwise no new complaints or concerns, is asymptomatic, no liver related hospitalizations, pruritus or fatigue, abdominal swelling, lower extremity mid, nausea or vomiting, hematemesis, melena, or hematochezia. OV 6/29/22  Patient came in as follow-up and further evaluation of elevated alkaline phosphatase, had extensive work-up, ultrasound of the abdomen ruled out extrahepatic cholestasis, had FibroScan which showed no advanced fibrosis but moderate steatosis, noted positive YULY and smooth muscle antibody, negative IgG and antimitochondrial antibody. Patient has a history of autoimmune positive markers with genetic markers for lupus. Otherwise viral hepatitis studies were negative. No history of EtOH. Patient has associated medical conditions including class II obesity with BMI of 35, high cholesterol.   Otherwise she is asymptomatic denies pruritus or excessive fatigue, no liver related hospitalizations, memory loss or confusion, jaundice, easy bruising constant nausea vomiting, hematemesis, melena, or hematochezia   OV 6/1/22  Patient came in as follow-up with new concerns of elevated alkaline phosphatase, review of records noted elevated alkaline phosphatase less than 2 times normal since , isoenzymes noted hepatic origin, noted normal transaminases. Patient reports being told many years ago she had a fatty liver, reported  that alk phos was normal after significant wt loss in the past.  No history of EtOH or viral hepatitis. Reports maternal grandmother with liver cirrhosis, unknown etiology. Noted associated medical conditions including high cholesterol, hypertension, and class II obesity with BMI of 35. Pt reports a hx of venous and arterial thrombosis during pregnancy with multiple miscarriages, required anticoagulation during pregnancy, was told she had genetic markers of Lupus. Patient is asymptomatic, denies pruritus, or excessive fatigue. No liver related hospitalizations, memory loss or confusion, jaundice, easy bruising, nausea vomiting, hematemesis, melena, or hematochezia. Noted history of normal colonoscopy in 2017. Background  Patient had VV to establish GI care with complaints of a history of C. difficile, was diagnosed with C. difficile in early January was treated with vancomycin and she is now asymptomatic. C. difficile was contracted after clindamycin for dental work. Specifically wanted to discuss candidacy for fecal transplant and treatment options for C. difficile infection if it reoccurs. Discussed at length the pathophysiology, treatment options and fecal transplant with the patient.     Past Medical History:   Diagnosis Date    Breast disorder     right anneurysm    Essential hypertension 2019    Hypercoagulopathy ([address]     Microcytic anemia 11/15/2013    Vertigo 11/15/2013    Vitamin D deficiency 11/15/2013     Past Surgical History:   Procedure Laterality Date     SECTION      X2    DILATION AND CURETTAGE  2016    [facility]    EYE SURGERY Bilateral     lasik    HYSTEROSCOPY  2016    [facility]    INCONTINENCE SURGERY      POLYPECTOMY  2016 [facility]    [place] COLON CA SCRN NOT  [address] IND N/A 5/24/2017    COLONOSCOPY performed by Edenilson Francis MD at [address] History     Socioeconomic History    Marital status:      Spouse name: Not on file    Number of children: Not on file    Years of education: Not on file    Highest education level: Not on file   Occupational History    Not on file   Tobacco Use    Smoking status: Never    Smokeless tobacco: Never   Vaping Use    Vaping Use: Never used   Substance and Sexual Activity    Alcohol use: Yes     Comment: Ever Couple Months    Drug use: No    Sexual activity: Yes     Partners: Male   Other Topics Concern    Not on file   Social History Narrative    Not on file     Social Determinants of Health     Financial Resource Strain: Low Risk     Difficulty of Paying Living Expenses: Not hard at all   Food Insecurity: No Food Insecurity    Worried About Running Out of Food in the Last Year: Never true    Ran Out of Food in the Last Year: Never true   Transportation Needs: Not on file   Physical Activity: Not on file   Stress: Not on file   Social Connections: Not on file   Intimate Partner Violence: Not on file   Housing Stability: Not on file     Family History   Problem Relation Age of Onset    Allergies Mother     Anemia Mother     Diabetes Father     Cancer Other     High Blood Pressure Other     Breast Cancer Neg Hx     Colon Cancer Neg Hx     Colon Polyps Neg Hx     Crohn's Disease Neg Hx      Allergies   Allergen Reactions    Lisinopril Other (See Comments)     COUGH  COUGH    Penicillins Hives         Review of Systems   Constitutional:  Negative for appetite change, chills, fever and unexpected weight change. HENT:  Negative for nosebleeds, tinnitus, trouble swallowing and voice change. Eyes:  Negative for photophobia, pain and redness. Respiratory:  Negative for chest tightness, shortness of breath and wheezing.     Cardiovascular:  Negative for chest pain, palpitations and leg swelling. Gastrointestinal:  Negative for abdominal distention, abdominal pain, anal bleeding, blood in stool, constipation, diarrhea, nausea, rectal pain and vomiting. Endocrine: Negative for polydipsia, polyphagia and polyuria. Genitourinary:  Negative for difficulty urinating and hematuria. Skin:  Negative for color change, pallor and rash. Neurological:  Negative for dizziness, speech difficulty and headaches. Psychiatric/Behavioral:  Negative for confusion and suicidal ideas. Objective:   /70 (Site: Right Upper Arm, Position: Sitting, Cuff Size: Small Adult)   Pulse 64   Wt 179 lb (81.2 kg)   SpO2 98%   BMI 31.71 kg/m²     Physical Exam  Vitals reviewed. Constitutional:       General: She is not in acute distress. Appearance: Normal appearance. She is well-developed and well-groomed. HENT:      Head: Normocephalic and atraumatic. Nose: Nose normal.   Eyes:      General: No scleral icterus. Extraocular Movements: Extraocular movements intact. Conjunctiva/sclera: Conjunctivae normal.      Pupils: Pupils are equal, round, and reactive to light. Cardiovascular:      Rate and Rhythm: Normal rate and regular rhythm. Pulses: Normal pulses. Heart sounds: Normal heart sounds. Pulmonary:      Effort: Pulmonary effort is normal. No respiratory distress. Breath sounds: Normal breath sounds. No wheezing or rales. Abdominal:      General: Abdomen is flat. Bowel sounds are normal. There is no distension. Palpations: Abdomen is soft. There is no hepatomegaly, splenomegaly or mass. Tenderness: There is no abdominal tenderness. There is no guarding or rebound. Musculoskeletal:         General: No tenderness or deformity. Normal range of motion. Cervical back: Neck supple. Right lower leg: No edema. Left lower leg: No edema. Skin:     General: Skin is warm and dry.       Capillary Refill: Capillary refill takes less than 2 seconds. Coloration: Skin is not jaundiced. Findings: No erythema or rash. Neurological:      General: No focal deficit present. Mental Status: She is alert and oriented to person, place, and time. Psychiatric:         Mood and Affect: Mood normal.         Behavior: Behavior normal.       Laboratory, Pathology, Radiology reviewed in detail with relevantimportant investigations summarized below:  Lab Results   Component Value Date/Time    WBC 7.7 06/02/2022 03:50 PM    WBC 8.6 04/11/2022 10:22 AM    WBC 8.2 10/06/2020 03:31 PM    WBC 7.7 09/10/2019 10:40 AM    WBC 8.1 04/05/2017 10:14 AM    HGB 11.5 06/02/2022 03:50 PM    HGB 11.9 04/11/2022 10:22 AM    HGB 12.9 10/06/2020 03:31 PM    HGB 13.1 09/10/2019 10:40 AM    HGB 13.8 04/05/2017 10:14 AM    HCT 35.2 06/02/2022 03:50 PM    HCT 37.9 04/11/2022 10:22 AM    HCT 40.3 10/06/2020 03:31 PM    HCT 41.0 09/10/2019 10:40 AM    HCT 41.7 04/05/2017 10:14 AM    MCV 86.9 06/02/2022 03:50 PM    MCV 88.4 04/11/2022 10:22 AM    MCV 88.0 10/06/2020 03:31 PM    MCV 90.1 09/10/2019 10:40 AM    MCV 88.5 04/05/2017 10:14 AM     06/02/2022 03:50 PM     04/11/2022 10:22 AM     10/06/2020 03:31 PM     09/10/2019 10:40 AM     04/05/2017 10:14 AM    .  Lab Results   Component Value Date/Time    ALT 19 06/02/2022 03:50 PM    ALT 15 04/11/2022 10:22 AM    ALT 16 08/20/2021 08:40 AM    AST 30 06/02/2022 03:50 PM    AST 33 04/11/2022 10:22 AM    AST 28 08/20/2021 08:40 AM    ALKPHOS 168 06/02/2022 03:50 PM    ALKPHOS 176 04/13/2022 03:41 PM    ALKPHOS 179 04/11/2022 10:22 AM    ALKPHOS 197 08/20/2021 08:40 AM    BILITOT 0.3 06/02/2022 03:50 PM    BILITOT <0.2 04/11/2022 10:22 AM    BILITOT 0.4 08/20/2021 08:40 AM       No results found.   Lab Results   Component Value Date/Time    IRON 37 06/02/2022 03:50 PM    IRON 50 10/06/2020 03:10 PM    IRON 42 09/10/2019 10:40 AM    IRON 58 04/05/2017 10:14 AM IRON 20 11/15/2013 10:05 AM    TIBC 334 06/02/2022 03:50 PM    TIBC 353 10/06/2020 03:10 PM    TIBC 321 09/10/2019 10:40 AM    TIBC 339 04/05/2017 10:14 AM    TIBC 400 11/15/2013 10:05 AM    FERRITIN 26 06/02/2022 03:50 PM     No results found for: INR  No components found for: ACUTEHEPATITISSCREEN  No components found for: CELIACPANEL  No components found for: STOOLCULTURE, C.DIFF, STOOLOVAPARASITE, STOOLLEUCOCYTE    Findings:       Median kPa:  3.9   IQR/med (%):  46       Controlled Attenuation Paramater (CAP)   Median (dB/m):  299   IQR (%):  39       Interpretation:   Suboptimal reading, Based on LSM of 3.9 Kpa, NO evidence of advanced    fibrosis noted   Fibrosis stage 0-I ( F0-1)   Based on CAP of 299 db/M, Mild to moderate steatosis (S1-2). Clinical correlation indicated        Arnoldo Herrera MD   [facility]       Assessment:       Diagnosis Orders   1. Abnormal alkaline phosphatase test  Comprehensive Metabolic Panel    YULY    Anti-smooth muscle antibody    MITOCHONDRIAL ANTIBODIES, M2, IGG            Plan:      1. Elevated alk phos  elevated alkaline phosphatase less than 2 times normal since 2013, isoenzymes noted hepatic origin, noted normal transaminases. Patient is asymptomatic, denies pruritus, or excessive fatigue. Ultrasound ruled out extrahepatic cholestasis, FibroScan showed no advanced fibrosis. No history of EtOH, viral hepatitis are negative  Noted positive YULY with elevated smooth muscle antibody, negative IgG and antimitochondrial antibody, could be indicative of autoimmune hepatitis with component of [place], noted history of positive genetic markers for lupus and questionable antiphospholipid syndrome. Noted associated medical conditions including high cholesterol, hypertension, and class II obesity with BMI of 31, pt has lost 30 lbs since May 2022 intentionally.   Discussed proceeding with liver biopsy for definitive diagnosis and staging purposes, patient declined to proceed  -Obtain serial blood work    2- Chronic liver disease staging:  No clinical or lab data suggestive of advanced liver disease   FibroScan showed no advanced fibrosis but mild to moderate steatosis  3. Associated medical conditions include but are not limited too. .. Hypertension, high cholesterol, class II obesity with BMI of 35, vertigo, lupus vs ? antiphospholipid syndrome    Return in about 6 months (around 3/29/2023), or if symptoms worsen or fail to improve.       Yoni Torres, APRN - CNP

## 2022-10-10 DIAGNOSIS — R74.8 ABNORMAL ALKALINE PHOSPHATASE TEST: ICD-10-CM

## 2022-10-10 LAB
ALBUMIN SERPL-MCNC: 4.5 G/DL (ref 3.5–4.6)
ALP BLD-CCNC: 168 U/L (ref 40–130)
ALT SERPL-CCNC: 17 U/L (ref 0–33)
ANION GAP SERPL CALCULATED.3IONS-SCNC: 12 MEQ/L (ref 9–15)
AST SERPL-CCNC: 25 U/L (ref 0–35)
BILIRUB SERPL-MCNC: 0.3 MG/DL (ref 0.2–0.7)
BUN BLDV-MCNC: 10 MG/DL (ref 6–20)
CALCIUM SERPL-MCNC: 9.2 MG/DL (ref 8.5–9.9)
CHLORIDE BLD-SCNC: 104 MEQ/L (ref 95–107)
CO2: 26 MEQ/L (ref 20–31)
CREAT SERPL-MCNC: 0.77 MG/DL (ref 0.5–0.9)
GFR AFRICAN AMERICAN: >60
GFR NON-AFRICAN AMERICAN: >60
GLOBULIN: 3.5 G/DL (ref 2.3–3.5)
GLUCOSE BLD-MCNC: 86 MG/DL (ref 70–99)
POTASSIUM SERPL-SCNC: 3.4 MEQ/L (ref 3.4–4.9)
SODIUM BLD-SCNC: 142 MEQ/L (ref 135–144)
TOTAL PROTEIN: 8 G/DL (ref 6.3–8)

## 2022-10-11 LAB — MITOCHONDRIAL M2 AB, IGG: 1.2 U/ML (ref 0–4)

## 2022-10-18 LAB — F-ACTIN AB IGA: 53.1 UNITS (ref 0–24.9)

## 2022-10-26 DIAGNOSIS — I10 ESSENTIAL HYPERTENSION: ICD-10-CM

## 2022-10-26 NOTE — TELEPHONE ENCOUNTER
Requesting medication refill.  Please approve or deny this request.    Rx requested:  Requested Prescriptions     Pending Prescriptions Disp Refills    olmesartan (BENICAR) 5 MG tablet [Pharmacy Med Name: OLMESARTAN MEDOXOMIL 5MG TABLETS] 90 tablet 3     Sig: TAKE 1 TABLET BY MOUTH DAILY       Last Office Visit:   8/8/2022    Next Visit Date:  Future Appointments   Date Time Provider Jaime Woodard   11/7/2022  8:00 AM [facility] [place]   3/29/2023  8:00 AM Benedicto Hameed

## 2022-10-27 RX ORDER — OLMESARTAN MEDOXOMIL 5 MG/1
5 TABLET ORAL DAILY
Qty: 90 TABLET | Refills: 3 | Status: SHIPPED | OUTPATIENT
Start: 2022-10-27

## 2022-12-12 ENCOUNTER — OFFICE VISIT (OUTPATIENT)
Dept: FAMILY MEDICINE CLINIC | Age: 57
End: 2022-12-12
Payer: COMMERCIAL

## 2022-12-12 ENCOUNTER — TELEPHONE (OUTPATIENT)
Dept: FAMILY MEDICINE CLINIC | Age: 57
End: 2022-12-12

## 2022-12-12 VITALS
TEMPERATURE: 97.3 F | OXYGEN SATURATION: 98 % | HEART RATE: 68 BPM | BODY MASS INDEX: 31.18 KG/M2 | SYSTOLIC BLOOD PRESSURE: 118 MMHG | DIASTOLIC BLOOD PRESSURE: 80 MMHG | WEIGHT: 176 LBS

## 2022-12-12 DIAGNOSIS — Z12.31 BREAST CANCER SCREENING BY MAMMOGRAM: Primary | ICD-10-CM

## 2022-12-12 DIAGNOSIS — E66.9 CLASS 1 OBESITY WITHOUT SERIOUS COMORBIDITY WITH BODY MASS INDEX (BMI) OF 31.0 TO 31.9 IN ADULT, UNSPECIFIED OBESITY TYPE: Primary | ICD-10-CM

## 2022-12-12 DIAGNOSIS — E78.00 PURE HYPERCHOLESTEROLEMIA: ICD-10-CM

## 2022-12-12 PROCEDURE — 3078F DIAST BP <80 MM HG: CPT | Performed by: NURSE PRACTITIONER

## 2022-12-12 PROCEDURE — 3074F SYST BP LT 130 MM HG: CPT | Performed by: NURSE PRACTITIONER

## 2022-12-12 PROCEDURE — 99213 OFFICE O/P EST LOW 20 MIN: CPT | Performed by: NURSE PRACTITIONER

## 2022-12-12 RX ORDER — ROSUVASTATIN CALCIUM 10 MG/1
10 TABLET, COATED ORAL NIGHTLY
Qty: 30 TABLET | Refills: 5 | Status: SHIPPED | OUTPATIENT
Start: 2022-12-12

## 2022-12-12 RX ORDER — PHENTERMINE AND TOPIRAMATE 11.25; 69 MG/1; MG/1
1 CAPSULE, EXTENDED RELEASE ORAL DAILY
Qty: 30 CAPSULE | Refills: 2 | Status: SHIPPED | OUTPATIENT
Start: 2022-12-12 | End: 2023-03-12

## 2022-12-12 ASSESSMENT — ENCOUNTER SYMPTOMS
COUGH: 0
DIARRHEA: 0
SHORTNESS OF BREATH: 0
CONSTIPATION: 0

## 2022-12-12 NOTE — PROGRESS NOTES
Subjective  Chief Complaint   Patient presents with    Weight Loss     Weight loss medication check       HPI    Here for a fu of weight loss. Down about 20 lbs on qysmia. She notes that she feels like it has plateaud some. Asking if there is a higher dose to help? Overall pt has been feeling well.  Wondering if it is necessary to continue with current dose of BP and cholesterol med      Patient Active Problem List    Diagnosis Date Noted    Elevated alkaline phosphatase level 2022    Abnormal ultrasound of breast 2018    Hx of Clostridium difficile infection 2021    Visual loss 10/23/2020    Chest discomfort 2019    Essential hypertension 2019    Family history of sudden cardiac death (SCD) 2019    Pure hypercholesterolemia 2019    Venous insufficiency of left lower extremity 04/15/2019    Asymptomatic telangiectasia 04/15/2019    Family history of anemia 11/15/2013    Vertigo 11/15/2013    Microcytic anemia 11/15/2013    Vitamin D deficiency 11/15/2013    Hypercoagulopathy ([address]      Past Medical History:   Diagnosis Date    Breast disorder     right anneurysm    Essential hypertension 2019    Hypercoagulopathy ([address]     Microcytic anemia 11/15/2013    Vertigo 11/15/2013    Vitamin D deficiency 11/15/2013     Past Surgical History:   Procedure Laterality Date     SECTION      X2    DILATION AND CURETTAGE  2016    [facility]    EYE SURGERY Bilateral     lasik    HYSTEROSCOPY  2016    [facility]    INCONTINENCE SURGERY      POLYPECTOMY  2016    [facility]    [place] COLON CA SCRN NOT  [address] IND N/A 2017    COLONOSCOPY performed by Brenda Edwards MD at [address] [place] [address]       Family History   Problem Relation Age of Onset    Allergies Mother     Anemia Mother     Diabetes Father     Cancer Other     High Blood Pressure Other     Breast Cancer Neg Hx     Colon Cancer Neg Hx     Colon Polyps Neg Hx Crohn's Disease Neg Hx      Social History     Socioeconomic History    Marital status:      Spouse name: None    Number of children: None    Years of education: None    Highest education level: None   Tobacco Use    Smoking status: Never    Smokeless tobacco: Never   Vaping Use    Vaping Use: Never used   Substance and Sexual Activity    Alcohol use: Yes     Comment: Ever Couple Months    Drug use: No    Sexual activity: Yes     Partners: Male     Social Determinants of Health     Financial Resource Strain: Low Risk     Difficulty of Paying Living Expenses: Not hard at all   Food Insecurity: No Food Insecurity    Worried About [facility] in the Last Year: Never true    [address] [place] [address] in the Last Year: Never true     Current Outpatient Medications on File Prior to Visit   Medication Sig Dispense Refill    olmesartan (BENICAR) 5 MG tablet TAKE 1 TABLET BY MOUTH DAILY 90 tablet 3    polyethyl glycol-propyl glycol 0.4-0.3 % (SYSTANE) 0.4-0.3 % ophthalmic solution Place 1 drop into both eyes as needed for Dry Eyes 30 mL 0    Cholecalciferol (VITAMIN D) 50 MCG (2000 UT) CAPS capsule Take by mouth      Flaxseed, Linseed, (FLAXSEED OIL PO) Take by mouth      Multiple Vitamins-Minerals (MULTIVITAMIN ADULT PO) Take by mouth      aspirin 81 MG chewable tablet Take 81 mg by mouth daily      levonorgestrel (MIRENA, 52 MG,) IUD 52 mg 1 each by Intrauterine route once       No current facility-administered medications on file prior to visit. Allergies   Allergen Reactions    Lisinopril Other (See Comments)     COUGH  COUGH    Penicillins Hives       Review of Systems   Constitutional:  Negative for fatigue. Respiratory:  Negative for cough and shortness of breath. Cardiovascular:  Negative for chest pain. Gastrointestinal:  Negative for constipation and diarrhea.      Objective  Vitals:    12/12/22 1017   BP: 118/80   Site: Left Upper Arm   Position: Sitting   Cuff Size: Medium Adult   Pulse: 68 Temp: 97.3 °F (36.3 °C)   TempSrc: Infrared   SpO2: 98%   Weight: 176 lb (79.8 kg)     Physical Exam  Vitals and nursing note reviewed. Constitutional:       Appearance: Normal appearance. HENT:      Head: Normocephalic. Nose: Nose normal.      Mouth/Throat:      Mouth: Mucous membranes are moist.      Pharynx: Oropharynx is clear. Eyes:      Extraocular Movements: Extraocular movements intact. Conjunctiva/sclera: Conjunctivae normal.      Pupils: Pupils are equal, round, and reactive to light. Cardiovascular:      Rate and Rhythm: Normal rate and regular rhythm. Pulses: Normal pulses. Heart sounds: Normal heart sounds. Pulmonary:      Effort: Pulmonary effort is normal.      Breath sounds: Normal breath sounds. Musculoskeletal:      Cervical back: Neck supple. Skin:     General: Skin is warm. Neurological:      General: No focal deficit present. Mental Status: She is alert and oriented to person, place, and time. Mental status is at baseline. Psychiatric:         Mood and Affect: Mood normal.         Behavior: Behavior normal.         Thought Content: Thought content normal.         Judgment: Judgment normal.       Assessment & Plan     Diagnosis Orders   1. Class 1 obesity without serious comorbidity with body mass index (BMI) of 31.0 to 31.9 in adult, unspecified obesity type  Phentermine-Topiramate (QSYMIA) 11.25-69 MG CP24      2. Pure hypercholesterolemia  rosuvastatin (CRESTOR) 10 MG tablet          No orders of the defined types were placed in this encounter. Orders Placed This Encounter   Medications    rosuvastatin (CRESTOR) 10 MG tablet     Sig: Take 1 tablet by mouth nightly     Dispense:  30 tablet     Refill:  5    Phentermine-Topiramate (QSYMIA) 11.25-69 MG CP24     Sig: Take 1 capsule by mouth daily for 90 days.      Dispense:  30 capsule     Refill:  2       Medications Discontinued During This Encounter   Medication Reason    rosuvastatin ([place]) 10 MG tablet REORDER      Fu in 3 mos    Side effects, adverse effects of the medication prescribed today, as well as treatment plan/ rationale and result expectations have been discussed with the patient who expresses understanding and desires to proceed. Close follow up to evaluate treatment results and for coordination of care. I have reviewed the patient's medical history in detail and updated the computerized patient record. As always, patient is advised that if symptoms worsen in any way they will proceed to the nearest emergency room.         Aurora Stafford, WALLACE - CNP

## 2023-01-07 ENCOUNTER — OFFICE VISIT (OUTPATIENT)
Dept: OBGYN CLINIC | Age: 58
End: 2023-01-07

## 2023-01-07 VITALS
BODY MASS INDEX: 31.18 KG/M2 | WEIGHT: 176 LBS | HEART RATE: 87 BPM | DIASTOLIC BLOOD PRESSURE: 72 MMHG | SYSTOLIC BLOOD PRESSURE: 138 MMHG

## 2023-01-07 DIAGNOSIS — Z01.419 WOMEN'S ANNUAL ROUTINE GYNECOLOGICAL EXAMINATION: Primary | ICD-10-CM

## 2023-01-07 DIAGNOSIS — N92.4 ABNORMAL PERIMENOPAUSAL BLEEDING: ICD-10-CM

## 2023-01-07 DIAGNOSIS — Z30.431 INTRAUTERINE DEVICE SURVEILLANCE: ICD-10-CM

## 2023-01-07 RX ORDER — NITROFURANTOIN 25; 75 MG/1; MG/1
CAPSULE ORAL
COMMUNITY
Start: 2023-01-02

## 2023-01-07 ASSESSMENT — ENCOUNTER SYMPTOMS
NAUSEA: 0
VOMITING: 0
CONSTIPATION: 0
DIARRHEA: 0
TROUBLE SWALLOWING: 0
COUGH: 0
SHORTNESS OF BREATH: 0
VOICE CHANGE: 0
RHINORRHEA: 0
SORE THROAT: 0
ABDOMINAL PAIN: 0

## 2023-01-07 NOTE — PROGRESS NOTES
Chief Complaint:     Boubacar Holguin is a 60 y.o. female who presents here today for complaints of:      Chief Complaint   Patient presents with    Procedure           Annual Exam     Pt would like annual exam, as well as to possibly discuss IUD removal. Copper IUD possibly, pt cant remember. History of Present Illness:     Boubacar Holguin is a 60 y.o. female who presents for her annual exam.    Concerns Today:    Considering removing her IUD    Prior Pap History:  10/10/20 - NILM, HPV Negative  10/7/17 - NILM, HPV Negative    Perimenopausal Bleeding   Utilizing hormonal contraception to relieve uncomfortable menstrual symptoms. Mirena IUD placed about 6-7 years ago to control perimenopausal bleeding symptoms. The IUD has worked very effectively and she has not had any bleeding since its insertion. Discussed leaving her IUD in place for 1 year longer, then removing it to see if any bleeding returns. Past Medical History: Allergies:  Lisinopril and Penicillins  No LMP recorded. Patient has had an implant. Obstetrical History:  [id number]     Past Medical History:   Diagnosis Date    Breast disorder     right anneurysm    Essential hypertension 9/20/2019    Hypercoagulopathy ([facility] [place] [address]     Microcytic anemia 11/15/2013    Vertigo 11/15/2013    Vitamin D deficiency 11/15/2013     Medications:     Current Outpatient Medications on File Prior to Visit   Medication Sig Dispense Refill    nitrofurantoin, macrocrystal-monohydrate, (MACROBID) 100 MG capsule TAKE 1 CAPSULE BY MOUTH TWICE DAILY FOR 5 DAYS      rosuvastatin (CRESTOR) 10 MG tablet Take 1 tablet by mouth nightly 30 tablet 5    Phentermine-Topiramate (QSYMIA) 11.25-69 MG CP24 Take 1 capsule by mouth daily for 90 days.  30 capsule 2    olmesartan (BENICAR) 5 MG tablet TAKE 1 TABLET BY MOUTH DAILY 90 tablet 3    polyethyl glycol-propyl glycol 0.4-0.3 % (SYSTANE) 0.4-0.3 % ophthalmic solution Place 1 drop into both eyes as needed for Dry Eyes 30 mL 0 Cholecalciferol (VITAMIN D) 50 MCG (2000 UT) CAPS capsule Take by mouth      Flaxseed, Linseed, (FLAXSEED OIL PO) Take by mouth      Multiple Vitamins-Minerals (MULTIVITAMIN ADULT PO) Take by mouth      aspirin 81 MG chewable tablet Take 81 mg by mouth daily      levonorgestrel (MIRENA, 52 MG,) IUD 52 mg 1 each by Intrauterine route once       No current facility-administered medications on file prior to visit. Review of Systems:     Review of Systems   Constitutional:  Negative for activity change, appetite change, chills, diaphoresis, fatigue, fever and unexpected weight change. HENT:  Negative for congestion, postnasal drip, rhinorrhea, sneezing, sore throat, trouble swallowing and voice change. Respiratory:  Negative for cough and shortness of breath. Cardiovascular:  Negative for chest pain. Gastrointestinal:  Negative for abdominal pain, constipation, diarrhea, nausea and vomiting. Genitourinary:  Negative for difficulty urinating, dyspareunia, dysuria, frequency, genital sores, menstrual problem, pelvic pain, vaginal bleeding, vaginal discharge and vaginal pain. Musculoskeletal:  Negative for arthralgias and myalgias. Neurological:  Negative for dizziness, syncope and headaches. Hematological:  Negative for adenopathy. All other systems reviewed and are negative. Physical Exam:     Vitals:  /72   Pulse 87   Wt 176 lb (79.8 kg)   BMI 31.18 kg/m²     Physical Exam  Constitutional:       General: She is not in acute distress. Appearance: Normal appearance. She is not ill-appearing. HENT:      Mouth/Throat:      Mouth: Mucous membranes are moist.   Eyes:      General: No scleral icterus. Right eye: No discharge. Left eye: No discharge. Cardiovascular:      Rate and Rhythm: Normal rate. Pulmonary:      Effort: Pulmonary effort is normal. No respiratory distress.    Chest:   Breasts:     Breasts are symmetrical.      Right: Normal. No swelling, bleeding, inverted nipple, mass, nipple discharge, skin change or tenderness. Left: Normal. No swelling, bleeding, inverted nipple, mass, nipple discharge, skin change or tenderness. Abdominal:      Palpations: Abdomen is soft. Hernia: There is no hernia in the left inguinal area or right inguinal area. Genitourinary:     Pubic Area: No rash or pubic lice. Labia:         Right: No rash, tenderness, lesion or injury. Left: No rash, tenderness, lesion or injury. Urethra: No prolapse, urethral pain, urethral swelling or urethral lesion. Vagina: Normal.      Cervix: Normal.      Uterus: Normal.       Adnexa: Right adnexa normal and left adnexa normal.      Rectum: Normal.         Musculoskeletal:         General: Normal range of motion. Cervical back: Normal range of motion and neck supple. Right lower leg: No edema. Left lower leg: No edema. Lymphadenopathy:      Upper Body:      Right upper body: No supraclavicular, axillary or pectoral adenopathy. Left upper body: No supraclavicular, axillary or pectoral adenopathy. Lower Body: No right inguinal adenopathy. No left inguinal adenopathy. Skin:     General: Skin is warm and dry. Capillary Refill: Capillary refill takes less than 2 seconds. Coloration: Skin is not jaundiced or pale. Neurological:      Mental Status: She is alert and oriented to person, place, and time. Mental status is at baseline. Psychiatric:         Mood and Affect: Mood normal.         Behavior: Behavior normal.     Assessment:      Diagnosis Orders   1. Women's annual routine gynecological examination        2. Abnormal perimenopausal bleeding        3.  Intrauterine device surveillance          Plan:     Annual Exam, Screening for STD's  Pap - Deferred per Routine Screening Guidelines  Screening for STD's - Declined    Abnormal Perimenopausal Bleeding  Mirena IUD placed about 6-7 years ago    IUD Surveillance  IUD strings easily visualized  Remove next year    Follow Up:  Return in about 1 year (around 1/7/2024) for Annual Well Woman Visit. No orders of the defined types were placed in this encounter. No orders of the defined types were placed in this encounter.       WALLACE Barnett CNM

## 2023-01-17 ENCOUNTER — HOSPITAL ENCOUNTER (OUTPATIENT)
Dept: WOMENS IMAGING | Age: 58
Discharge: HOME | End: 2023-01-19
Payer: COMMERCIAL

## 2023-01-17 DIAGNOSIS — Z12.31 BREAST CANCER SCREENING BY MAMMOGRAM: ICD-10-CM

## 2023-01-17 PROCEDURE — 77063 BREAST TOMOSYNTHESIS BI: CPT

## 2023-05-15 ENCOUNTER — OFFICE VISIT (OUTPATIENT)
Dept: FAMILY MEDICINE CLINIC | Age: 58
End: 2023-05-15
Payer: COMMERCIAL

## 2023-05-15 VITALS
OXYGEN SATURATION: 98 % | DIASTOLIC BLOOD PRESSURE: 66 MMHG | SYSTOLIC BLOOD PRESSURE: 102 MMHG | BODY MASS INDEX: 30.12 KG/M2 | HEIGHT: 63 IN | HEART RATE: 71 BPM | WEIGHT: 170 LBS

## 2023-05-15 DIAGNOSIS — I10 ESSENTIAL HYPERTENSION: ICD-10-CM

## 2023-05-15 DIAGNOSIS — E78.00 PURE HYPERCHOLESTEROLEMIA: ICD-10-CM

## 2023-05-15 DIAGNOSIS — E66.09 CLASS 1 OBESITY DUE TO EXCESS CALORIES WITH SERIOUS COMORBIDITY AND BODY MASS INDEX (BMI) OF 30.0 TO 30.9 IN ADULT: Primary | ICD-10-CM

## 2023-05-15 DIAGNOSIS — R73.03 BORDERLINE DIABETES: ICD-10-CM

## 2023-05-15 PROCEDURE — 3074F SYST BP LT 130 MM HG: CPT | Performed by: NURSE PRACTITIONER

## 2023-05-15 PROCEDURE — 99213 OFFICE O/P EST LOW 20 MIN: CPT | Performed by: NURSE PRACTITIONER

## 2023-05-15 PROCEDURE — 3078F DIAST BP <80 MM HG: CPT | Performed by: NURSE PRACTITIONER

## 2023-05-15 RX ORDER — PHENTERMINE AND TOPIRAMATE 15; 92 MG/1; MG/1
1 CAPSULE, EXTENDED RELEASE ORAL DAILY
Qty: 30 CAPSULE | Refills: 2 | Status: SHIPPED | OUTPATIENT
Start: 2023-05-15 | End: 2023-08-13

## 2023-05-15 RX ORDER — ROSUVASTATIN CALCIUM 10 MG/1
10 TABLET, COATED ORAL NIGHTLY
Qty: 90 TABLET | Refills: 1 | Status: SHIPPED | OUTPATIENT
Start: 2023-05-15

## 2023-05-15 RX ORDER — PHENTERMINE AND TOPIRAMATE 11.25; 69 MG/1; MG/1
CAPSULE, EXTENDED RELEASE ORAL
Qty: 30 CAPSULE | Refills: 2 | Status: CANCELLED | OUTPATIENT
Start: 2023-05-15

## 2023-05-15 RX ORDER — OLMESARTAN MEDOXOMIL 5 MG/1
5 TABLET ORAL DAILY
Qty: 90 TABLET | Refills: 1 | Status: CANCELLED | OUTPATIENT
Start: 2023-05-15

## 2023-05-15 RX ORDER — PHENTERMINE AND TOPIRAMATE 11.25; 69 MG/1; MG/1
CAPSULE, EXTENDED RELEASE ORAL
COMMUNITY
Start: 2023-04-17 | End: 2023-05-15

## 2023-05-15 SDOH — ECONOMIC STABILITY: FOOD INSECURITY: WITHIN THE PAST 12 MONTHS, THE FOOD YOU BOUGHT JUST DIDN'T LAST AND YOU DIDN'T HAVE MONEY TO GET MORE.: NEVER TRUE

## 2023-05-15 SDOH — ECONOMIC STABILITY: INCOME INSECURITY: HOW HARD IS IT FOR YOU TO PAY FOR THE VERY BASICS LIKE FOOD, HOUSING, MEDICAL CARE, AND HEATING?: NOT VERY HARD

## 2023-05-15 SDOH — ECONOMIC STABILITY: HOUSING INSECURITY
IN THE LAST 12 MONTHS, WAS THERE A TIME WHEN YOU DID NOT HAVE A STEADY PLACE TO SLEEP OR SLEPT IN A SHELTER (INCLUDING NOW)?: NO

## 2023-05-15 SDOH — ECONOMIC STABILITY: FOOD INSECURITY: WITHIN THE PAST 12 MONTHS, YOU WORRIED THAT YOUR FOOD WOULD RUN OUT BEFORE YOU GOT MONEY TO BUY MORE.: NEVER TRUE

## 2023-05-15 ASSESSMENT — ENCOUNTER SYMPTOMS
SHORTNESS OF BREATH: 0
CONSTIPATION: 0
DIARRHEA: 0
COUGH: 0

## 2023-05-15 ASSESSMENT — PATIENT HEALTH QUESTIONNAIRE - PHQ9
SUM OF ALL RESPONSES TO PHQ9 QUESTIONS 1 & 2: 0
SUM OF ALL RESPONSES TO PHQ QUESTIONS 1-9: 0
2. FEELING DOWN, DEPRESSED OR HOPELESS: 0
1. LITTLE INTEREST OR PLEASURE IN DOING THINGS: 0

## 2023-05-15 NOTE — PROGRESS NOTES
Subjective  Chief Complaint   Patient presents with    3 Month Follow-Up       HPI    Pt is here for a weight loss follow up. Has lost 36 lbs in the past year with qsythmia. No side effects. Diet- doesn't \"obsess about food\" Only eating while hungry. Exercise- started back up when weather has gotten nicer.      Making smaller goals    Patient Active Problem List    Diagnosis Date Noted    Elevated alkaline phosphatase level 2022    Abnormal ultrasound of breast 2018    Hx of Clostridium difficile infection 2021    Visual loss 10/23/2020    Chest discomfort 2019    Essential hypertension 2019    Family history of sudden cardiac death (SCD) 2019    Pure hypercholesterolemia 2019    Venous insufficiency of left lower extremity 04/15/2019    Asymptomatic telangiectasia 04/15/2019    Family history of anemia 11/15/2013    Vertigo 11/15/2013    Microcytic anemia 11/15/2013    Vitamin D deficiency 11/15/2013    Hypercoagulopathy ([address]      Past Medical History:   Diagnosis Date    Breast disorder     right anneurysm    Essential hypertension 2019    Hypercoagulopathy ([address]     Microcytic anemia 11/15/2013    Vertigo 11/15/2013    Vitamin D deficiency 11/15/2013     Past Surgical History:   Procedure Laterality Date     SECTION      X2    DILATION AND CURETTAGE  2016    [facility]    EYE SURGERY Bilateral     lasik    HYSTEROSCOPY  2016    [facility]    INCONTINENCE SURGERY      POLYPECTOMY  2016    [facility]    MN COLON CA SCRN NOT  [address] IND N/A 2017    COLONOSCOPY performed by Charle Sicard, MD at [address] [place] [address]       Family History   Problem Relation Age of Onset    Allergies Mother     Anemia Mother     Diabetes Father     Cancer Other     High Blood Pressure Other     Breast Cancer Neg Hx     Colon Cancer Neg Hx     Colon Polyps Neg Hx     Crohn's Disease Neg Hx      Social History

## 2023-10-11 DIAGNOSIS — E66.09 CLASS 1 OBESITY DUE TO EXCESS CALORIES WITH SERIOUS COMORBIDITY AND BODY MASS INDEX (BMI) OF 30.0 TO 30.9 IN ADULT: ICD-10-CM

## 2023-10-11 RX ORDER — PHENTERMINE AND TOPIRAMATE 15; 92 MG/1; MG/1
1 CAPSULE, EXTENDED RELEASE ORAL DAILY
Qty: 30 CAPSULE | OUTPATIENT
Start: 2023-10-11

## 2023-10-11 NOTE — TELEPHONE ENCOUNTER
Patient is requesting medication refill. Please approve or deny this request.    Rx requested:  Requested Prescriptions     Pending Prescriptions Disp Refills    QSYMIA 15-92 MG CP24 [Pharmacy Med Name: QSYMIA 15 MG-92 MG CAPSULE] 30 capsule      Sig: Take 1 capsule by mouth daily. Last Office Visit:   5/15/2023      Next Visit Date:  No future appointments.

## 2023-11-05 DIAGNOSIS — I10 ESSENTIAL HYPERTENSION: ICD-10-CM

## 2023-11-06 RX ORDER — OLMESARTAN MEDOXOMIL 5 MG/1
5 TABLET ORAL DAILY
Qty: 30 TABLET | Refills: 5 | Status: SHIPPED | OUTPATIENT
Start: 2023-11-06

## 2023-11-06 NOTE — TELEPHONE ENCOUNTER
Comments:     Last Office Visit (last PCP visit):   5/15/2023    Next Visit Date:  No future appointments.    **If hasn't been seen in over a year OR hasn't followed up according to last diabetes/ADHD visit, make appointment for patient before sending refill to provider.    Rx requested:  Requested Prescriptions     Pending Prescriptions Disp Refills    olmesartan (BENICAR) 5 MG tablet [Pharmacy Med Name: OLMESARTAN MEDOXOMIL 5 MG TAB] 330 tablet      Sig: take 1 tablet by mouth once daily

## 2024-06-04 ENCOUNTER — ANESTHESIA (OUTPATIENT)
Dept: OPERATING ROOM | Facility: HOSPITAL | Age: 59
End: 2024-06-04
Payer: COMMERCIAL

## 2024-06-04 ENCOUNTER — HOSPITAL ENCOUNTER (OUTPATIENT)
Facility: HOSPITAL | Age: 59
Setting detail: OUTPATIENT SURGERY
Discharge: HOME | End: 2024-06-04
Attending: ORTHOPAEDIC SURGERY | Admitting: ORTHOPAEDIC SURGERY
Payer: COMMERCIAL

## 2024-06-04 ENCOUNTER — ANESTHESIA EVENT (OUTPATIENT)
Dept: OPERATING ROOM | Facility: HOSPITAL | Age: 59
End: 2024-06-04
Payer: COMMERCIAL

## 2024-06-04 ENCOUNTER — APPOINTMENT (OUTPATIENT)
Dept: RADIOLOGY | Facility: HOSPITAL | Age: 59
End: 2024-06-04
Payer: COMMERCIAL

## 2024-06-04 VITALS
HEIGHT: 63 IN | SYSTOLIC BLOOD PRESSURE: 142 MMHG | BODY MASS INDEX: 31.89 KG/M2 | WEIGHT: 180 LBS | DIASTOLIC BLOOD PRESSURE: 78 MMHG | RESPIRATION RATE: 16 BRPM | OXYGEN SATURATION: 92 % | TEMPERATURE: 98.1 F | HEART RATE: 77 BPM

## 2024-06-04 DIAGNOSIS — S52.022A OLECRANON FRACTURE, LEFT, CLOSED, INITIAL ENCOUNTER: Primary | ICD-10-CM

## 2024-06-04 PROCEDURE — 2500000004 HC RX 250 GENERAL PHARMACY W/ HCPCS (ALT 636 FOR OP/ED): Performed by: ORTHOPAEDIC SURGERY

## 2024-06-04 PROCEDURE — 2500000005 HC RX 250 GENERAL PHARMACY W/O HCPCS: Performed by: ANESTHESIOLOGIST ASSISTANT

## 2024-06-04 PROCEDURE — 2500000001 HC RX 250 WO HCPCS SELF ADMINISTERED DRUGS (ALT 637 FOR MEDICARE OP): Performed by: ANESTHESIOLOGY

## 2024-06-04 PROCEDURE — 7100000001 HC RECOVERY ROOM TIME - INITIAL BASE CHARGE: Performed by: ORTHOPAEDIC SURGERY

## 2024-06-04 PROCEDURE — 2720000007 HC OR 272 NO HCPCS: Performed by: ORTHOPAEDIC SURGERY

## 2024-06-04 PROCEDURE — C1713 ANCHOR/SCREW BN/BN,TIS/BN: HCPCS | Performed by: ORTHOPAEDIC SURGERY

## 2024-06-04 PROCEDURE — 2780000003 HC OR 278 NO HCPCS: Performed by: ORTHOPAEDIC SURGERY

## 2024-06-04 PROCEDURE — 3700000001 HC GENERAL ANESTHESIA TIME - INITIAL BASE CHARGE: Performed by: ORTHOPAEDIC SURGERY

## 2024-06-04 PROCEDURE — 2500000004 HC RX 250 GENERAL PHARMACY W/ HCPCS (ALT 636 FOR OP/ED): Performed by: ANESTHESIOLOGY

## 2024-06-04 PROCEDURE — 7100000010 HC PHASE TWO TIME - EACH INCREMENTAL 1 MINUTE: Performed by: ORTHOPAEDIC SURGERY

## 2024-06-04 PROCEDURE — 7100000002 HC RECOVERY ROOM TIME - EACH INCREMENTAL 1 MINUTE: Performed by: ORTHOPAEDIC SURGERY

## 2024-06-04 PROCEDURE — 3600000008 HC OR TIME - EACH INCREMENTAL 1 MINUTE - PROCEDURE LEVEL THREE: Performed by: ORTHOPAEDIC SURGERY

## 2024-06-04 PROCEDURE — 3700000002 HC GENERAL ANESTHESIA TIME - EACH INCREMENTAL 1 MINUTE: Performed by: ORTHOPAEDIC SURGERY

## 2024-06-04 PROCEDURE — 2500000004 HC RX 250 GENERAL PHARMACY W/ HCPCS (ALT 636 FOR OP/ED): Performed by: ANESTHESIOLOGIST ASSISTANT

## 2024-06-04 PROCEDURE — 3600000003 HC OR TIME - INITIAL BASE CHARGE - PROCEDURE LEVEL THREE: Performed by: ORTHOPAEDIC SURGERY

## 2024-06-04 PROCEDURE — 7100000009 HC PHASE TWO TIME - INITIAL BASE CHARGE: Performed by: ORTHOPAEDIC SURGERY

## 2024-06-04 DEVICE — IMPLANTABLE DEVICE: Type: IMPLANTABLE DEVICE | Site: ELBOW | Status: FUNCTIONAL

## 2024-06-04 DEVICE — SCREW, LOCK VA 2.7 X 12 ST T8 SDRV: Type: IMPLANTABLE DEVICE | Site: ELBOW | Status: FUNCTIONAL

## 2024-06-04 DEVICE — SCREW LOCKING 3.5 W/RECESS 24: Type: IMPLANTABLE DEVICE | Site: ELBOW | Status: FUNCTIONAL

## 2024-06-04 DEVICE — SCREW LOCKING 3.5 W/RECESS 18: Type: IMPLANTABLE DEVICE | Site: ELBOW | Status: FUNCTIONAL

## 2024-06-04 DEVICE — SCREW, CORTICAL, SELF-TAPPING, 3.5 X 22 MM, STAINLESS STEEL: Type: IMPLANTABLE DEVICE | Site: ELBOW | Status: FUNCTIONAL

## 2024-06-04 DEVICE — SCREW, LOCK VA 2.7 X 50 ST T8: Type: IMPLANTABLE DEVICE | Site: ELBOW | Status: FUNCTIONAL

## 2024-06-04 DEVICE — SCREW, LOCK 2.7 X 18 VA ST T8 STARDRIVE RECESS: Type: IMPLANTABLE DEVICE | Site: ELBOW | Status: FUNCTIONAL

## 2024-06-04 DEVICE — WIRE, KIRSCHNER, TROCAR POINT, 1.6 X 150 MM: Type: IMPLANTABLE DEVICE | Site: ELBOW | Status: NON-FUNCTIONAL

## 2024-06-04 DEVICE — SCREW, LOCK 2.7 X 20 VA ST T8 STARDRIVE RECESS: Type: IMPLANTABLE DEVICE | Site: ELBOW | Status: FUNCTIONAL

## 2024-06-04 DEVICE — BONE, PUTTY DBX  1CC DE-MINERAL: Type: IMPLANTABLE DEVICE | Site: ELBOW | Status: FUNCTIONAL

## 2024-06-04 DEVICE — SCREW, LOCK VA 2.7 X 46 ST T8: Type: IMPLANTABLE DEVICE | Site: ELBOW | Status: FUNCTIONAL

## 2024-06-04 DEVICE — SCREW, CORTICAL, SELF-TAPPING, 3.5 X 28 MM, STAINLESS STEEL: Type: IMPLANTABLE DEVICE | Site: ELBOW | Status: NON-FUNCTIONAL

## 2024-06-04 RX ORDER — ROCURONIUM BROMIDE 10 MG/ML
INJECTION, SOLUTION INTRAVENOUS AS NEEDED
Status: DISCONTINUED | OUTPATIENT
Start: 2024-06-04 | End: 2024-06-04

## 2024-06-04 RX ORDER — OLMESARTAN MEDOXOMIL 5 MG/1
5 TABLET ORAL DAILY
COMMUNITY
Start: 2023-11-06

## 2024-06-04 RX ORDER — ALBUTEROL SULFATE 0.83 MG/ML
2.5 SOLUTION RESPIRATORY (INHALATION)
Status: DISCONTINUED | OUTPATIENT
Start: 2024-06-04 | End: 2024-06-04 | Stop reason: HOSPADM

## 2024-06-04 RX ORDER — HYDROCODONE BITARTRATE AND ACETAMINOPHEN 5; 325 MG/1; MG/1
1 TABLET ORAL EVERY 4 HOURS PRN
Status: DISCONTINUED | OUTPATIENT
Start: 2024-06-04 | End: 2024-06-04 | Stop reason: HOSPADM

## 2024-06-04 RX ORDER — DIPHENHYDRAMINE HYDROCHLORIDE 50 MG/ML
12.5 INJECTION INTRAMUSCULAR; INTRAVENOUS ONCE AS NEEDED
Status: DISCONTINUED | OUTPATIENT
Start: 2024-06-04 | End: 2024-06-04 | Stop reason: HOSPADM

## 2024-06-04 RX ORDER — HYDRALAZINE HYDROCHLORIDE 20 MG/ML
5 INJECTION INTRAMUSCULAR; INTRAVENOUS EVERY 30 MIN PRN
Status: DISCONTINUED | OUTPATIENT
Start: 2024-06-04 | End: 2024-06-04 | Stop reason: HOSPADM

## 2024-06-04 RX ORDER — HYDROMORPHONE HYDROCHLORIDE 1 MG/ML
1 INJECTION, SOLUTION INTRAMUSCULAR; INTRAVENOUS; SUBCUTANEOUS EVERY 5 MIN PRN
Status: DISCONTINUED | OUTPATIENT
Start: 2024-06-04 | End: 2024-06-04 | Stop reason: HOSPADM

## 2024-06-04 RX ORDER — MIDAZOLAM HYDROCHLORIDE 1 MG/ML
INJECTION, SOLUTION INTRAMUSCULAR; INTRAVENOUS AS NEEDED
Status: DISCONTINUED | OUTPATIENT
Start: 2024-06-04 | End: 2024-06-04

## 2024-06-04 RX ORDER — CEFAZOLIN SODIUM 2 G/100ML
INJECTION, SOLUTION INTRAVENOUS AS NEEDED
Status: DISCONTINUED | OUTPATIENT
Start: 2024-06-04 | End: 2024-06-04

## 2024-06-04 RX ORDER — ASPIRIN 81 MG/1
81 TABLET ORAL DAILY
COMMUNITY

## 2024-06-04 RX ORDER — CEFAZOLIN SODIUM 2 G/100ML
2 INJECTION, SOLUTION INTRAVENOUS ONCE
Status: CANCELLED | OUTPATIENT
Start: 2024-06-04 | End: 2024-06-04

## 2024-06-04 RX ORDER — VANCOMYCIN HYDROCHLORIDE 1 G/20ML
INJECTION, POWDER, LYOPHILIZED, FOR SOLUTION INTRAVENOUS AS NEEDED
Status: DISCONTINUED | OUTPATIENT
Start: 2024-06-04 | End: 2024-06-04 | Stop reason: HOSPADM

## 2024-06-04 RX ORDER — PROPOFOL 10 MG/ML
INJECTION, EMULSION INTRAVENOUS AS NEEDED
Status: DISCONTINUED | OUTPATIENT
Start: 2024-06-04 | End: 2024-06-04

## 2024-06-04 RX ORDER — SODIUM CHLORIDE, SODIUM LACTATE, POTASSIUM CHLORIDE, CALCIUM CHLORIDE 600; 310; 30; 20 MG/100ML; MG/100ML; MG/100ML; MG/100ML
INJECTION, SOLUTION INTRAVENOUS CONTINUOUS PRN
Status: DISCONTINUED | OUTPATIENT
Start: 2024-06-04 | End: 2024-06-04

## 2024-06-04 RX ORDER — SODIUM CHLORIDE, SODIUM LACTATE, POTASSIUM CHLORIDE, CALCIUM CHLORIDE 600; 310; 30; 20 MG/100ML; MG/100ML; MG/100ML; MG/100ML
100 INJECTION, SOLUTION INTRAVENOUS CONTINUOUS
Status: DISCONTINUED | OUTPATIENT
Start: 2024-06-04 | End: 2024-06-04 | Stop reason: HOSPADM

## 2024-06-04 RX ORDER — OXYCODONE HYDROCHLORIDE 5 MG/1
5 TABLET ORAL EVERY 6 HOURS PRN
Qty: 28 TABLET | Refills: 0 | Status: SHIPPED | OUTPATIENT
Start: 2024-06-04 | End: 2024-06-11

## 2024-06-04 RX ORDER — HYDROMORPHONE HYDROCHLORIDE 1 MG/ML
INJECTION, SOLUTION INTRAMUSCULAR; INTRAVENOUS; SUBCUTANEOUS AS NEEDED
Status: DISCONTINUED | OUTPATIENT
Start: 2024-06-04 | End: 2024-06-04

## 2024-06-04 RX ORDER — FENTANYL CITRATE 50 UG/ML
INJECTION, SOLUTION INTRAMUSCULAR; INTRAVENOUS AS NEEDED
Status: DISCONTINUED | OUTPATIENT
Start: 2024-06-04 | End: 2024-06-04

## 2024-06-04 RX ORDER — BUPIVACAINE HYDROCHLORIDE 5 MG/ML
INJECTION, SOLUTION EPIDURAL; INTRACAUDAL AS NEEDED
Status: DISCONTINUED | OUTPATIENT
Start: 2024-06-04 | End: 2024-06-04 | Stop reason: HOSPADM

## 2024-06-04 RX ORDER — ROSUVASTATIN CALCIUM 10 MG/1
1 TABLET, COATED ORAL NIGHTLY
COMMUNITY
Start: 2023-05-15

## 2024-06-04 RX ORDER — LIDOCAINE HYDROCHLORIDE 20 MG/ML
INJECTION, SOLUTION INFILTRATION; PERINEURAL AS NEEDED
Status: DISCONTINUED | OUTPATIENT
Start: 2024-06-04 | End: 2024-06-04

## 2024-06-04 RX ORDER — LABETALOL HYDROCHLORIDE 5 MG/ML
5 INJECTION, SOLUTION INTRAVENOUS
Status: DISCONTINUED | OUTPATIENT
Start: 2024-06-04 | End: 2024-06-04 | Stop reason: HOSPADM

## 2024-06-04 RX ORDER — METOCLOPRAMIDE HYDROCHLORIDE 5 MG/ML
10 INJECTION INTRAMUSCULAR; INTRAVENOUS ONCE AS NEEDED
Status: COMPLETED | OUTPATIENT
Start: 2024-06-04 | End: 2024-06-04

## 2024-06-04 RX ORDER — ONDANSETRON HYDROCHLORIDE 2 MG/ML
INJECTION, SOLUTION INTRAVENOUS AS NEEDED
Status: DISCONTINUED | OUTPATIENT
Start: 2024-06-04 | End: 2024-06-04

## 2024-06-04 RX ORDER — MIDAZOLAM HYDROCHLORIDE 1 MG/ML
1 INJECTION, SOLUTION INTRAMUSCULAR; INTRAVENOUS ONCE AS NEEDED
Status: DISCONTINUED | OUTPATIENT
Start: 2024-06-04 | End: 2024-06-04 | Stop reason: HOSPADM

## 2024-06-04 RX ADMIN — ROCURONIUM BROMIDE 50 MG: 10 INJECTION INTRAVENOUS at 14:16

## 2024-06-04 RX ADMIN — METOCLOPRAMIDE 10 MG: 5 INJECTION, SOLUTION INTRAMUSCULAR; INTRAVENOUS at 18:31

## 2024-06-04 RX ADMIN — HYDROCODONE BITARTRATE AND ACETAMINOPHEN 1 TABLET: 5; 325 TABLET ORAL at 18:19

## 2024-06-04 RX ADMIN — PROPOFOL 200 MG: 10 INJECTION, EMULSION INTRAVENOUS at 14:16

## 2024-06-04 RX ADMIN — SODIUM CHLORIDE, POTASSIUM CHLORIDE, SODIUM LACTATE AND CALCIUM CHLORIDE: 600; 310; 30; 20 INJECTION, SOLUTION INTRAVENOUS at 14:15

## 2024-06-04 RX ADMIN — HYDROMORPHONE HYDROCHLORIDE 0.5 MG: 1 INJECTION, SOLUTION INTRAMUSCULAR; INTRAVENOUS; SUBCUTANEOUS at 17:00

## 2024-06-04 RX ADMIN — ONDANSETRON 4 MG: 2 INJECTION INTRAMUSCULAR; INTRAVENOUS at 16:01

## 2024-06-04 RX ADMIN — DEXAMETHASONE SODIUM PHOSPHATE 4 MG: 4 INJECTION, SOLUTION INTRAMUSCULAR; INTRAVENOUS at 14:28

## 2024-06-04 RX ADMIN — SUGAMMADEX 200 MG: 100 INJECTION, SOLUTION INTRAVENOUS at 16:22

## 2024-06-04 RX ADMIN — HYDROMORPHONE HYDROCHLORIDE 0.5 MG: 1 INJECTION, SOLUTION INTRAMUSCULAR; INTRAVENOUS; SUBCUTANEOUS at 15:12

## 2024-06-04 RX ADMIN — SODIUM CHLORIDE, POTASSIUM CHLORIDE, SODIUM LACTATE AND CALCIUM CHLORIDE: 600; 310; 30; 20 INJECTION, SOLUTION INTRAVENOUS at 14:34

## 2024-06-04 RX ADMIN — MIDAZOLAM 2 MG: 1 INJECTION INTRAMUSCULAR; INTRAVENOUS at 14:07

## 2024-06-04 RX ADMIN — CEFAZOLIN SODIUM 2 G: 2 INJECTION, SOLUTION INTRAVENOUS at 14:25

## 2024-06-04 RX ADMIN — HYDROMORPHONE HYDROCHLORIDE 0.5 MG: 1 INJECTION, SOLUTION INTRAMUSCULAR; INTRAVENOUS; SUBCUTANEOUS at 16:45

## 2024-06-04 RX ADMIN — FENTANYL CITRATE 50 MCG: 50 INJECTION, SOLUTION INTRAMUSCULAR; INTRAVENOUS at 14:50

## 2024-06-04 RX ADMIN — FENTANYL CITRATE 50 MCG: 50 INJECTION, SOLUTION INTRAMUSCULAR; INTRAVENOUS at 14:16

## 2024-06-04 RX ADMIN — HYDRALAZINE HYDROCHLORIDE 5 MG: 20 INJECTION INTRAMUSCULAR; INTRAVENOUS at 17:04

## 2024-06-04 RX ADMIN — HYDROMORPHONE HYDROCHLORIDE 0.5 MG: 1 INJECTION, SOLUTION INTRAMUSCULAR; INTRAVENOUS; SUBCUTANEOUS at 15:19

## 2024-06-04 RX ADMIN — LIDOCAINE HYDROCHLORIDE 80 MG: 20 INJECTION, SOLUTION INFILTRATION; PERINEURAL at 14:16

## 2024-06-04 ASSESSMENT — PAIN DESCRIPTION - LOCATION
LOCATION: ARM
LOCATION: ARM

## 2024-06-04 ASSESSMENT — COLUMBIA-SUICIDE SEVERITY RATING SCALE - C-SSRS
6. HAVE YOU EVER DONE ANYTHING, STARTED TO DO ANYTHING, OR PREPARED TO DO ANYTHING TO END YOUR LIFE?: NO
2. HAVE YOU ACTUALLY HAD ANY THOUGHTS OF KILLING YOURSELF?: NO
1. IN THE PAST MONTH, HAVE YOU WISHED YOU WERE DEAD OR WISHED YOU COULD GO TO SLEEP AND NOT WAKE UP?: NO

## 2024-06-04 ASSESSMENT — PAIN SCALES - GENERAL
PAINLEVEL_OUTOF10: 5 - MODERATE PAIN
PAINLEVEL_OUTOF10: 5 - MODERATE PAIN
PAINLEVEL_OUTOF10: 0 - NO PAIN
PAINLEVEL_OUTOF10: 8
PAINLEVEL_OUTOF10: 5 - MODERATE PAIN
PAINLEVEL_OUTOF10: 6
PAINLEVEL_OUTOF10: 6
PAINLEVEL_OUTOF10: 5 - MODERATE PAIN
PAINLEVEL_OUTOF10: 6

## 2024-06-04 ASSESSMENT — PAIN - FUNCTIONAL ASSESSMENT
PAIN_FUNCTIONAL_ASSESSMENT: 0-10

## 2024-06-04 ASSESSMENT — PAIN DESCRIPTION - DESCRIPTORS
DESCRIPTORS: SHARP
DESCRIPTORS: SORE
DESCRIPTORS: SHARP;SORE

## 2024-06-04 ASSESSMENT — PAIN DESCRIPTION - ORIENTATION
ORIENTATION: LEFT
ORIENTATION: LEFT

## 2024-06-04 ASSESSMENT — ENCOUNTER SYMPTOMS
ENDOCRINE NEGATIVE: 1
NEUROLOGICAL NEGATIVE: 1
CONFUSION: 0
JOINT SWELLING: 1
ALLERGIC/IMMUNOLOGIC NEGATIVE: 1
CARDIOVASCULAR NEGATIVE: 1
CONSTITUTIONAL NEGATIVE: 1
PSYCHIATRIC NEGATIVE: 1
EYES NEGATIVE: 1
RESPIRATORY NEGATIVE: 1
ARTHRALGIAS: 1

## 2024-06-04 ASSESSMENT — PAIN SCALES - PAIN ASSESSMENT IN ADVANCED DEMENTIA (PAINAD): TOTALSCORE: MEDICATION (SEE MAR);COLD APPLIED

## 2024-06-04 NOTE — DISCHARGE INSTRUCTIONS
Patient underwent open reduction internal fixation of left olecranon fracture.    Dressing to remain on until first postoperative visit    Move fingers often to promote circulation and reduce swelling.    No lifting pulling or pushing of the left upper extremity    Okay to shower however should keep splint and dressing clean dry and covered with either plastic bags or cast protector when showering.    If you notice any, redness, drainage or your incision comes apart please call the office [phone].    Patient may take ibuprofen 400 mg every 6 hours and 500 mg of Tylenol every 6 hours to help with pain relief.    You may resume your prehospital diet.    You are also given a prescription for narcotic medication.  This should be used intermittently and only on an as-needed basis.    Follow-up in office at previously scheduled appointment.    Nadir Montano MD  [facility]  [phone]

## 2024-06-04 NOTE — OP NOTE
Open reduction internal fixation Left Olecranon (L) Operative Note     Date: 2024  OR Location: [facility] OR    Name: Priscila Linares, : 1965, Age: 59 y.o., MRN: [id number], Sex: female    Diagnosis  Pre-op Diagnosis     * Displaced fracture of olecranon process without intraarticular extension of left ulna, initial encounter for closed fracture [S52.022A] Post-op Diagnosis     * Displaced fracture of olecranon process without intraarticular extension of left ulna, initial encounter for closed fracture [S52.022A]     Procedures    55434 - [place] OPEN TREATMENT ULNAR FRACTURE PROXIMAL END      Surgeons      * Nadir Montano - Primary    Resident/Fellow/Other Assistant:  Surgeons and Role:  * No surgeons found with a matching role *    Procedure Summary  Anesthesia: General  ASA: ASA status not filed in the log.  Anesthesia Staff: Anesthesiologist: Ariella Boothe MD  CRNA: RODRIGO Del Rio-CRNA, CIRO  C-AA: OBDULIO Sanchez; OBDULIO Cunningham  Estimated Blood Loss: 50 mL  Intra-op Medications:   Administrations occurring from 1310 to 1550 on 24:   Medication Name Total Dose   vancomycin (Vancocin) vial for injection 1 g              Anesthesia Record               Intraprocedure I/O Totals          Intake    LR infusion 900.00 mL    Total Intake 900 mL       Output    Est. Blood Loss 50 mL    Total Output 50 mL       Net    Net Volume 850 mL          Specimen: No specimens collected     Staff:   Circulator: Riacrdo  Scrub Person: Collins Chandlerub Person: Ranjana  Circulator: Carmine         Drains and/or Catheters: * None in log *    Tourniquet Times:   * Missing tourniquet times found for documented tourniquets in lo *     Implants:  Implants       Type Name Action Serial No.      Screw PLATE, 2.7/3.5MM PROX OLEC, 2H LT 73MM - [id number] Explanted      Screw PLATE, VA-LCP OLECRANON, 2 HOLE, LEFT, 90MM, SS - [id number] Implanted      Screw WIRE, NAMAN, TROCAR POINT, 1.6 X 150 MM - [id number]  Used, Not Implanted      Screw SCREW, CORTICAL, SELF-TAPPING, 3.5 X 22 MM, STAINLESS STEEL - [id number] Implanted      Screw SCREW, LOCK VA 2.7 X 46 ST T8 - [id number] Implanted      Screw SCREW, LOCK VA 2.7 X 12 ST T8 SDRV - [id number] Implanted      Screw SCREW, CORTICAL, SELF-TAPPING, 3.5 X 28 MM, STAINLESS STEEL - SNA - [id number] Used, Not Implanted NA     Screw SCREW, LOCK 2.7 X 20 VA ST T8 STARDRIVE RECESS - SN/A - [id number] Implanted N/A     Screw SCREW, LOCK VA 2.7 X 50 ST T8 - SN/A - [id number] Implanted N/A     Screw SCREW, LOCK 2.7 X 18 VA ST T8 STARDRIVE RECESS - SN/A - [id number] Implanted N/A     Screw SCREW LOCKING 3.5 W/RECESS 18 - SN/A - [id number] Implanted N/A     Screw SCREW LOCKING 3.5 W/RECESS 24 - SN/A - [id number] Implanted N/A     Screw SCREW, 2.7 X 44 ST METAPHYSEAL T8 - SN/A - [id number] Implanted N/A     Screw PLATE, VA-LCP OLECRANON, 2 HOLE, LEFT, 90MM, SS - SN/A - [id number] Implanted N/A     Graft BONE, PUTTY DBX  [facility] DE-MINERAL - [id number] - [id number] Implanted [id number]              Findings: Displaced left olecranon fracture with intact triceps attachment    Indications: Priscila Linares is an 59 y.o. female who is having surgery for displaced left olecranon fracture with loss of extensor mechanism function.  Patient sustained a fall with resultant pain swelling and difficulty extending the left arm.  Patient was diagnosed with having a displaced left olecranon fracture.  Discussion was had with patient in regards to risk benefits and alternatives of both operative and nonoperative intervention.  Recommendation was for operative intervention in the form of open reduction internal fixation versus olecranon excision and triceps tendon advancement.  Risk benefits and alternatives were discussed extents with patient she was in agreement to proceed.    The patient was seen in the preoperative area. The risks, benefits, complications, treatment options, non-operative  alternatives, expected recovery and outcomes were discussed with the patient. The possibilities of reaction to medication, pulmonary aspiration, injury to surrounding structures, bleeding, recurrent infection, the need for additional procedures, failure to diagnose a condition, and creating a complication requiring transfusion or operation were discussed with the patient. The patient concurred with the proposed plan, giving informed consent.  The site of surgery was properly noted/marked if necessary per policy. The patient has been actively warmed in preoperative area. Preoperative antibiotics have been ordered and given within 1 hours of incision. Venous thrombosis prophylaxis have been ordered including bilateral sequential compression devices    Procedure Details: Patient was taken the operating room placed into the right lateral decubitus position.  Appropriate positioning and padding was performed such that the operative arm was able to hang at a 90 degree angle and obtain appropriate radiographic imaging.  A tourniquet was applied to the left arm.  The left upper extremity was subsequently prepped and draped as is typical for an extremity procedure.  A timeout was performed, all parties were identified, and the correct surgical site was noted.  At the conclusion of the timeout, tourniquet was inflated to 250 mmHg after appropriate elevation and exsanguination of the operative extremity.  A posterior surgical approach was utilized to the operative elbow staying away from compromise skin over the radial aspect where the skin tearing had occurred.  Dissection was carried down through skin and subcutaneous tissue raising flaps both medially and laterally after fascia had been identified.   the flexor and extensor musculature off of the ulna utilizing the septum between them.  The fracture was easily identified.  There was extensive hematoma and soft tissue invagination appreciated at the fracture site.   This was subsequently cleaned with rongeur and irrigation.  Scalpel was utilized to clean off the fracture edges.  With extension of the arm, an anatomic reduction was able to be obtained.  This was held in place with cross K wire fixation.  I subsequently proceeded then to place a Synthes olecranon plate which did require a distal split of the triceps tendon.  Minimal contouring was needed in order to have this fit adequately.  This was fixated initially into the shaft with a locking screw which helped to suck the plate down to bone.  I placed a single metaphyseal screw into the proximal segment traversing into the distal shaft to provide compression.  I then proceeded to place multiple locking screws into the proximal segment with extension into the distal metaphysis.  Additional locking screw fixation was placed distally with exchange of the prior nonlocking screw for a locking component.  Patient had full extension and had full flexion at the elbow with no evidence of gapping at the fracture site no evidence of compromise of the construct.  There was a component of bone loss where the impaction and comminution occurred along the distal radial aspect and was filled with 1 cc of demineralized bone matrix.  Final fluoroscopic imaging was obtained confirming appropriate reduction of the fracture as well as positioning of components.  There is no evidence of intra-articular penetration and no evidence of violation or compromise of the distal radial ulnar joint.  Wound was copiously irrigated with normal saline.  Triceps tendon split was subsequently repaired with #1 Vicryl suture.  Skin was closed with 2-0 Vicryl and 3-0 stratafix.  A soft compressive dressing was applied under a posterior slab splint.  Patient tolerated the procedure well.  No complications occurred.    An assistant was used during this case.  Assistant Melvin Brower helped in the positioning, prepping, draping, retracting and aiding in my ability to  perform critical portions of this case and performed the majority of the closure and dressing application.  Help from the assistant was critical in performing these functions.    Complications:  None; patient tolerated the procedure well.    Disposition: PACU - hemodynamically stable.  Condition: stable       Postoperative plan: Patient did well intraoperatively.  Postoperative exam in PACU demonstrated intact neurovascular function of the left upper extremity.  Patient will be discharged home with no lifting pulling or pushing left upper extremity.  Prescription for narcotics was written and given to patient has a paper prescription.  Keep scheduled follow-up in office.      Nadir Montano  Phone Number: [phone]

## 2024-06-04 NOTE — H&P
History Of Present Illness  Priscila Linares is a 59 y.o. female presenting with left elbow fracture.  No real significant past medical history.  Does have a history of hyper coag ability during pregnancy and takes a daily baby aspirin.  States that on Wednesday of last week was standing on a stool adjusting her blinds when she fell backwards leg landing on her left elbow.  Denies hitting her head at this time and no loss of consciousness.  Immediately had pain and difficulty moving left elbow.  [facility] and on Friday followed up had x-rays and was told she had a fracture and would be in need of surgery.  She has had no recent fever or chills, chest pain, or shortness of breath.  Currently reports no pain in left elbow area unless pressure is applied.  No loss of sensation, numbness or tingling distal to injury.     Past Medical History  Hyperlipidemia, hypercoagulability    Surgical History   x 3, D&C, tubal ligation, bladder sling     Social History  Non-smoker, rarely drinks alcohol, no illicit substances,  and works in mental health    Family History  Father  at age 74 from complications of diabetes mellitus type 2 and COPD, mother alive and well at age 76     Allergies  Penicillin    Review of Systems   Constitutional: Negative.    HENT: Negative.     Eyes: Negative.    Respiratory: Negative.     Cardiovascular: Negative.    Endocrine: Negative.  Negative for cold intolerance.   Genitourinary: Negative.    Musculoskeletal:  Positive for arthralgias and joint swelling.   Skin: Negative.    Allergic/Immunologic: Negative.    Neurological: Negative.    Psychiatric/Behavioral: Negative.  Negative for confusion.         Physical Exam  Constitutional:       Appearance: Normal appearance.   HENT:      Head: Normocephalic and atraumatic.      Nose: Nose normal.      Mouth/Throat:      Mouth: Mucous membranes are moist.   Cardiovascular:      Rate and  "Rhythm: Normal rate.   Pulmonary:      Effort: Pulmonary effort is normal.   Abdominal:      General: Abdomen is flat.      Palpations: Abdomen is soft.   Musculoskeletal:      Cervical back: Neck supple.      Comments: Dependent bruising to left forearm and tenderness to palpation over left elbow, radial and ulnar pulses palpable left upper extremity, range of motion in left upper extremity intact  Strength 5 over 5 x 4, range of motion in other extremities intact   Skin:     General: Skin is warm and dry.   Neurological:      General: No focal deficit present.      Mental Status: She is alert.   Psychiatric:         Mood and Affect: Mood normal.          Last Recorded Vitals  Blood pressure 174/82, pulse 73, temperature 36.6 °C (97.9 °F), temperature source Temporal, resp. rate 18, height 1.6 m (5' 3\"), weight 81.6 kg (180 lb).    Relevant Results    No results found.   Scheduled medications    Continuous medications    PRN medications    No results found for this or any previous visit (from the past 24 hour(s)).    Assessment/Plan   Displaced left elbow fracture      Plan for ORIF versus olecranon excision and triceps tendon advancement, further assessment and plan by surgeon and or anesthesia       I spent 20 minutes in the professional and overall care of this patient.      Argentina Ritchie PA-C    "

## 2024-06-04 NOTE — ANESTHESIA PROCEDURE NOTES
Airway  Date/Time: 6/4/2024 2:18 PM  Urgency: elective    Airway not difficult    Staffing  Performed: OBDULIO   Authorized by: Ariella Boothe MD    Performed by: OBDULIO Sanchez  Patient location during procedure: OR    Indications and Patient Condition  Indications for airway management: anesthesia  Spontaneous Ventilation: absent  Sedation level: deep  Preoxygenated: yes  Patient position: sniffing  Mask difficulty assessment: 1 - vent by mask    Final Airway Details  Final airway type: endotracheal airway      Successful airway: ETT  Cuffed: yes   Successful intubation technique: direct laryngoscopy  Facilitating devices/methods: intubating stylet  Endotracheal tube insertion site: oral  Blade: Compa  Blade size: #3  ETT size (mm): 7.0  Cormack-Lehane Classification: grade I - full view of glottis  Placement verified by: chest auscultation and capnometry   Measured from: lips  ETT to lips (cm): 21  Number of attempts at approach: 1

## 2024-06-05 NOTE — ANESTHESIA PREPROCEDURE EVALUATION
Patient: Priscila Linares    Procedure Information       Anesthesia Start Date/Time: 06/04/24 1410    Procedure: Open reduction internal fixation versus olecranon excision and tricep tendon advancement left elbow (Left: Elbow)    Location: [facility] 02 / [facility]    Surgeons: Nadir Montano MD            Relevant Problems   Anesthesia (within normal limits)       Clinical information reviewed:   Tobacco  Allergies  Meds   Med Hx  Surg Hx   Fam Hx  Soc Hx        NPO Detail:  NPO/Void Status  Carbohydrate Drink Given Prior to Surgery? : N  Date of Last Liquid: 06/04/24  Time of Last Liquid: 2200  Date of Last Solid: 06/03/24  Time of Last Solid: 2200  Last Intake Type: Food  Time of Last Void: 1137         Physical Exam    Airway  Mallampati: II  TM distance: >3 FB  Neck ROM: full     Cardiovascular   Rhythm: regular  Rate: normal     Dental    Pulmonary   Breath sounds clear to auscultation     Abdominal   Abdomen: soft             Anesthesia Plan    History of general anesthesia?: yes  History of complications of general anesthesia?: no    ASA 2     general     intravenous induction   Postoperative administration of opioids is intended.  Anesthetic plan and risks discussed with patient.    Plan discussed with CAA, CRNA and attending.      
No

## 2024-06-05 NOTE — ANESTHESIA POSTPROCEDURE EVALUATION
Patient: Priscila Linares    Procedure Summary       Date: 06/04/24 Room / Location: [facility]    Anesthesia Start: 1410 Anesthesia Stop: 1636    Procedure: Open reduction internal fixation versus olecranon excision and tricep tendon advancement left elbow (Left: Elbow) Diagnosis:       Displaced fracture of olecranon process without intraarticular extension of left ulna, initial encounter for closed fracture      (S52.022A)    Surgeons: Nadir Montano MD Responsible Provider: Ariella Boothe MD    Anesthesia Type: general ASA Status: 2            Anesthesia Type: general    Vitals Value Taken Time   /80 06/04/24 1745   Temp 36.3 °C (97.3 °F) 06/04/24 1745   Pulse 81 06/04/24 1747   Resp 12 06/04/24 1747   SpO2 95 % 06/04/24 1747   Vitals shown include unfiled device data.    Anesthesia Post Evaluation    Patient location during evaluation: PACU  Patient participation: complete - patient participated  Level of consciousness: awake and alert  Pain management: adequate  Airway patency: patent  Cardiovascular status: acceptable  Respiratory status: acceptable  Hydration status: acceptable  Postoperative Nausea and Vomiting: none        No notable events documented.

## 2024-07-18 ENCOUNTER — TELEPHONE (OUTPATIENT)
Dept: OBGYN CLINIC | Age: 59
End: 2024-07-18

## 2024-07-18 DIAGNOSIS — Z12.31 SCREENING MAMMOGRAM FOR BREAST CANCER: Primary | ICD-10-CM

## 2024-07-18 NOTE — TELEPHONE ENCOUNTER
Patient called and scheduled annual would like to get mammogram done prior to the appointment. Per Soniya order placed.

## 2024-07-22 ENCOUNTER — HOSPITAL ENCOUNTER (OUTPATIENT)
Dept: WOMENS IMAGING | Age: 59
Discharge: HOME | End: 2024-07-24
Payer: COMMERCIAL

## 2024-07-22 DIAGNOSIS — Z12.31 SCREENING MAMMOGRAM FOR BREAST CANCER: ICD-10-CM

## 2024-07-22 PROCEDURE — 77063 BREAST TOMOSYNTHESIS BI: CPT

## 2024-08-28 ENCOUNTER — OFFICE VISIT (OUTPATIENT)
Dept: OBGYN CLINIC | Age: 59
End: 2024-08-28
Payer: COMMERCIAL

## 2024-08-28 VITALS
HEART RATE: 76 BPM | HEIGHT: 63 IN | SYSTOLIC BLOOD PRESSURE: 128 MMHG | WEIGHT: 205 LBS | BODY MASS INDEX: 36.32 KG/M2 | DIASTOLIC BLOOD PRESSURE: 76 MMHG

## 2024-08-28 DIAGNOSIS — Z11.51 SCREENING FOR HUMAN PAPILLOMAVIRUS: ICD-10-CM

## 2024-08-28 DIAGNOSIS — Z30.431 INTRAUTERINE DEVICE SURVEILLANCE: ICD-10-CM

## 2024-08-28 DIAGNOSIS — F41.8 DEPRESSION WITH ANXIETY: ICD-10-CM

## 2024-08-28 DIAGNOSIS — Z76.89 ENCOUNTER FOR WEIGHT MANAGEMENT: ICD-10-CM

## 2024-08-28 DIAGNOSIS — Z01.419 WOMEN'S ANNUAL ROUTINE GYNECOLOGICAL EXAMINATION: Primary | ICD-10-CM

## 2024-08-28 PROCEDURE — 99214 OFFICE O/P EST MOD 30 MIN: CPT | Performed by: ADVANCED PRACTICE MIDWIFE

## 2024-08-28 PROCEDURE — 3078F DIAST BP <80 MM HG: CPT | Performed by: ADVANCED PRACTICE MIDWIFE

## 2024-08-28 PROCEDURE — 99396 PREV VISIT EST AGE 40-64: CPT | Performed by: ADVANCED PRACTICE MIDWIFE

## 2024-08-28 PROCEDURE — 3074F SYST BP LT 130 MM HG: CPT | Performed by: ADVANCED PRACTICE MIDWIFE

## 2024-08-28 RX ORDER — PHENTERMINE AND TOPIRAMATE 3.75; 23 MG/1; MG/1
1 CAPSULE, EXTENDED RELEASE ORAL DAILY
Qty: 30 CAPSULE | Refills: 0 | Status: SHIPPED | OUTPATIENT
Start: 2024-08-28 | End: 2024-09-27

## 2024-08-28 RX ORDER — ESCITALOPRAM OXALATE 10 MG/1
TABLET ORAL
Qty: 30 TABLET | Refills: 2 | Status: SHIPPED | OUTPATIENT
Start: 2024-08-28 | End: 2024-11-26

## 2024-08-28 ASSESSMENT — ENCOUNTER SYMPTOMS
VOICE CHANGE: 0
SHORTNESS OF BREATH: 0
NAUSEA: 0
DIARRHEA: 0
CONSTIPATION: 0
TROUBLE SWALLOWING: 0
SORE THROAT: 0
ABDOMINAL PAIN: 0
VOMITING: 0
COUGH: 0
RHINORRHEA: 0

## 2024-08-28 NOTE — PROGRESS NOTES
Chief Complaint:     Aziza Lynch is a 59 y.o. female who presents here today for:      Chief Complaint   Patient presents with    Annual Exam     Last pap 10-10-20 WNL    Weight Management     History of Present Illness:     Aziza Lynch is a 59 y.o. female who presents for her annual exam.      IUD Surveillance  Mirena IUD placed previously (2016) when she was on estrogen replacement therapy.  She is no longer on HRT, but would like to postpone removal today until maybe next year.    Depression/Anxiety  Established history of anxiety that in the past has progressed to causing panic attacks.  Within the past year she has sent her youngest child off to college, purchased a new business, and overall had a significant increase in the amount of stress she is managing.  Declined beginning behavioral wellness visits today, would prefer to begin medication therapy.    Encounter for Weight Loss Counseling, History of Obesity  Reports a history of chronic obesity despite efforts to improve diet, increase exercise, and improve lifestyle.    She has taken Qsymia in the past and had good results with it, she would like to restart this treatment option again..  Past medical history was reviewed and updated as needed  Denies a history of or any known current conditions that would contraindicate the use of  Qsymia .  Adipex is a controlled substance and has the potential to become addictive.  It should not be taken if you have any history or tendency towards alcohol or drug abuse.  Possible medication side effects include fatigue/insomnia, constipation, anxiousness, palpitations, headache, dry mouth.  If adverse side effects occur, the medication must be discontinued.  Adipex is a stimulant, so it is best to take it in the morning  Discussed the importance of diet (encouraged low carb diet) and exercising at least 30 minutes 4-5x/week.    Past Medical History:     Allergies:  Lisinopril and Penicillins  No LMP  Negative for congestion, postnasal drip, rhinorrhea, sneezing, sore throat, trouble swallowing and voice change.    Respiratory:  Negative for cough and shortness of breath.    Cardiovascular:  Negative for chest pain.   Gastrointestinal:  Negative for abdominal pain, constipation, diarrhea, nausea and vomiting.   Genitourinary:  Negative for difficulty urinating, dyspareunia, dysuria, frequency, genital sores, menstrual problem, pelvic pain, vaginal bleeding, vaginal discharge and vaginal pain.   Musculoskeletal:  Negative for arthralgias and myalgias.   Neurological:  Negative for dizziness, syncope and headaches.   Hematological:  Negative for adenopathy.   Psychiatric/Behavioral:  Positive for decreased concentration, dysphoric mood and sleep disturbance. Negative for self-injury and suicidal ideas. The patient is nervous/anxious.    All other systems reviewed and are negative.    Physical Exam:     Vitals:  /76   Pulse 76   Ht 1.6 m (5' 3\")   Wt 93 kg (205 lb)   BMI 36.31 kg/m²     Prior Pap History:  10/10/20 - NILM, HPV Negative  10/7/17 - NILM, HPV Negative    Physical Exam  Vitals and nursing note reviewed.   Constitutional:       General: She is not in acute distress.     Appearance: Normal appearance. She is not ill-appearing, toxic-appearing or diaphoretic.   HENT:      Head: Normocephalic.      Nose: No congestion or rhinorrhea.      Mouth/Throat:      Mouth: Mucous membranes are moist.   Eyes:      General: No scleral icterus.        Right eye: No discharge.         Left eye: No discharge.   Cardiovascular:      Rate and Rhythm: Normal rate and regular rhythm.      Pulses: Normal pulses.   Pulmonary:      Effort: Pulmonary effort is normal. No respiratory distress.   Abdominal:      Palpations: Abdomen is soft.      Hernia: There is no hernia in the left inguinal area or right inguinal area.   Genitourinary:     General: Normal vulva.      Exam position: Lithotomy position.      Pubic Area:

## 2024-09-04 LAB
HPV HR 12 DNA SPEC QL NAA+PROBE: NOT DETECTED
HPV16 DNA SPEC QL NAA+PROBE: NOT DETECTED
HPV16+18+H RISK 12 DNA SPEC-IMP: NORMAL
HPV18 DNA SPEC QL NAA+PROBE: NOT DETECTED

## 2024-09-06 DIAGNOSIS — Z01.419 WOMEN'S ANNUAL ROUTINE GYNECOLOGICAL EXAMINATION: ICD-10-CM

## 2024-09-06 LAB
ALBUMIN SERPL-MCNC: 4 G/DL (ref 3.5–4.6)
ALP SERPL-CCNC: 169 U/L (ref 40–130)
ALT SERPL-CCNC: 14 U/L (ref 0–33)
ANION GAP SERPL CALCULATED.3IONS-SCNC: 10 MEQ/L (ref 9–15)
AST SERPL-CCNC: 23 U/L (ref 0–35)
BASOPHILS # BLD: 0.1 K/UL (ref 0–0.2)
BASOPHILS NFR BLD: 0.9 %
BILIRUB SERPL-MCNC: 0.3 MG/DL (ref 0.2–0.7)
BUN SERPL-MCNC: 12 MG/DL (ref 8–23)
CALCIUM SERPL-MCNC: 9.3 MG/DL (ref 8.5–9.9)
CHLORIDE SERPL-SCNC: 104 MEQ/L (ref 95–107)
CHOLEST SERPL-MCNC: 208 MG/DL (ref 0–199)
CO2 SERPL-SCNC: 25 MEQ/L (ref 20–31)
CREAT SERPL-MCNC: 0.96 MG/DL (ref 0.5–0.9)
EOSINOPHIL # BLD: 0.2 K/UL (ref 0–0.7)
EOSINOPHIL NFR BLD: 1.9 %
ERYTHROCYTE [DISTWIDTH] IN BLOOD BY AUTOMATED COUNT: 13.3 % (ref 11.5–14.5)
GLOBULIN SER CALC-MCNC: 3.6 G/DL (ref 2.3–3.5)
GLUCOSE SERPL-MCNC: 104 MG/DL (ref 70–99)
HCT VFR BLD AUTO: 42.6 % (ref 37–47)
HDLC SERPL-MCNC: 46 MG/DL (ref 40–59)
HGB BLD-MCNC: 13.5 G/DL (ref 12–16)
LDL CHOLESTEROL: 145 MG/DL (ref 0–129)
LYMPHOCYTES # BLD: 2.6 K/UL (ref 1–4.8)
LYMPHOCYTES NFR BLD: 32.5 %
MCH RBC QN AUTO: 28.8 PG (ref 27–31.3)
MCHC RBC AUTO-ENTMCNC: 31.7 % (ref 33–37)
MCV RBC AUTO: 91 FL (ref 79.4–94.8)
MONOCYTES # BLD: 0.5 K/UL (ref 0.2–0.8)
MONOCYTES NFR BLD: 6.7 %
NEUTROPHILS # BLD: 4.6 K/UL (ref 1.4–6.5)
NEUTS SEG NFR BLD: 57.6 %
PLATELET # BLD AUTO: 323 K/UL (ref 130–400)
POTASSIUM SERPL-SCNC: 4.6 MEQ/L (ref 3.4–4.9)
PROT SERPL-MCNC: 7.6 G/DL (ref 6.3–8)
RBC # BLD AUTO: 4.68 M/UL (ref 4.2–5.4)
SODIUM SERPL-SCNC: 139 MEQ/L (ref 135–144)
TRIGLYCERIDE, FASTING: 85 MG/DL (ref 0–150)
TSH REFLEX: 2.01 UIU/ML (ref 0.44–3.86)
WBC # BLD AUTO: 8 K/UL (ref 4.8–10.8)

## 2024-10-02 ENCOUNTER — TELEMEDICINE (OUTPATIENT)
Dept: OBGYN CLINIC | Age: 59
End: 2024-10-02
Payer: COMMERCIAL

## 2024-10-02 DIAGNOSIS — T50.905A DRUG-RELATED HAIR LOSS: ICD-10-CM

## 2024-10-02 DIAGNOSIS — L65.8 DRUG-RELATED HAIR LOSS: ICD-10-CM

## 2024-10-02 DIAGNOSIS — Z76.89 ENCOUNTER FOR WEIGHT MANAGEMENT: Primary | ICD-10-CM

## 2024-10-02 PROCEDURE — 99214 OFFICE O/P EST MOD 30 MIN: CPT | Performed by: ADVANCED PRACTICE MIDWIFE

## 2024-10-02 RX ORDER — PHENTERMINE HYDROCHLORIDE 37.5 MG/1
37.5 TABLET ORAL
Qty: 30 TABLET | Refills: 0 | Status: SHIPPED | OUTPATIENT
Start: 2024-10-02 | End: 2024-11-01

## 2024-10-02 ASSESSMENT — ENCOUNTER SYMPTOMS
NAUSEA: 0
CONSTIPATION: 0
SHORTNESS OF BREATH: 0
ABDOMINAL PAIN: 0
DIARRHEA: 0
VOMITING: 0
COUGH: 0

## 2024-10-02 NOTE — PROGRESS NOTES
Aziza yLnch (:  1965) is a Established patient, here for evaluation of the following:    Chief Complaint   Patient presents with    Weight Management     Discuss switching to adipex for weight loss        Assessment & Plan   Below is the assessment and plan developed based on review of pertinent history, physical exam, labs, studies, and medications:    1. Encounter for weight management  -     phentermine (ADIPEX-P) 37.5 MG tablet; Take 1 tablet by mouth every morning (before breakfast) for 30 days. Max Daily Amount: 37.5 mg, Disp-30 tablet, R-0Normal  2. Body mass index (BMI) 36.0-36.9, adult  -     phentermine (ADIPEX-P) 37.5 MG tablet; Take 1 tablet by mouth every morning (before breakfast) for 30 days. Max Daily Amount: 37.5 mg, Disp-30 tablet, R-0Normal  3. Drug-related hair loss      Return in about 3 months (around 2025).       Subjective   Encounter for Weight Loss Counseling, History of Obesity  Recently started Qysemia, but had to discontinue it due to the side effect of hair loss.  She noticed a significant increase in hair loss when starting the medication that resolved as soon as she discontinued it.  Requesting to transition from Qsemia to Adipex..  Past medical history was reviewed and updated as needed  Denies a history of or any known current conditions that would contraindicate the use of Adipex.  Adipex is a controlled substance and has the potential to become addictive.  It should not be taken if you have any history or tendency towards alcohol or drug abuse.  Possible medication side effects include fatigue/insomnia, constipation, anxiousness, palpitations, headache, dry mouth.  If adverse side effects occur, the medication must be discontinued.  Adipex is a stimulant, so it is best to take it in the morning  Discussed the importance of diet (encouraged low carb diet) and exercising at least 30 minutes 4-5x/week.        Review of Systems   Respiratory:  Negative for cough and

## 2024-11-18 ENCOUNTER — TELEPHONE (OUTPATIENT)
Dept: OBGYN CLINIC | Age: 59
End: 2024-11-18

## 2024-11-18 DIAGNOSIS — Z76.89 ENCOUNTER FOR WEIGHT MANAGEMENT: ICD-10-CM

## 2024-11-18 NOTE — TELEPHONE ENCOUNTER
Pt called and stated that she was able to handle the Adipex and she is asking for 90 day supply to be sent in to [facility].    She has a 3 month appt made for Feb

## 2024-11-19 RX ORDER — PHENTERMINE HYDROCHLORIDE 37.5 MG/1
37.5 TABLET ORAL
Qty: 30 TABLET | Refills: 0 | Status: SHIPPED | OUTPATIENT
Start: 2024-11-19 | End: 2024-12-19

## 2024-11-19 NOTE — TELEPHONE ENCOUNTER
Her last appointment was 10/2/24, this would mean that she needs a 3-month follow-up visit by 1/2/25.    I can't prescribe Adipex for her beyond 1/2/25 without a visit.  I went ahead and sent a 30-day prescription which will carry her through 12/19/24.      I can then either send a partial prescription or she can move her appointment up to 12/19/24.

## 2025-04-03 ENCOUNTER — OFFICE VISIT (OUTPATIENT)
Dept: FAMILY MEDICINE CLINIC | Age: 60
End: 2025-04-03
Payer: COMMERCIAL

## 2025-04-03 VITALS
HEIGHT: 63 IN | SYSTOLIC BLOOD PRESSURE: 150 MMHG | OXYGEN SATURATION: 98 % | TEMPERATURE: 98.7 F | DIASTOLIC BLOOD PRESSURE: 96 MMHG | WEIGHT: 206.2 LBS | BODY MASS INDEX: 36.54 KG/M2 | HEART RATE: 83 BPM

## 2025-04-03 DIAGNOSIS — I10 ESSENTIAL HYPERTENSION: ICD-10-CM

## 2025-04-03 DIAGNOSIS — E55.9 VITAMIN D DEFICIENCY: Primary | ICD-10-CM

## 2025-04-03 PROCEDURE — 3077F SYST BP >= 140 MM HG: CPT | Performed by: NURSE PRACTITIONER

## 2025-04-03 PROCEDURE — 99214 OFFICE O/P EST MOD 30 MIN: CPT | Performed by: NURSE PRACTITIONER

## 2025-04-03 PROCEDURE — 3080F DIAST BP >= 90 MM HG: CPT | Performed by: NURSE PRACTITIONER

## 2025-04-03 RX ORDER — OLMESARTAN MEDOXOMIL 20 MG/1
20 TABLET ORAL DAILY
Qty: 90 TABLET | Refills: 0 | Status: SHIPPED | OUTPATIENT
Start: 2025-04-03

## 2025-04-03 SDOH — ECONOMIC STABILITY: FOOD INSECURITY: WITHIN THE PAST 12 MONTHS, THE FOOD YOU BOUGHT JUST DIDN'T LAST AND YOU DIDN'T HAVE MONEY TO GET MORE.: NEVER TRUE

## 2025-04-03 SDOH — ECONOMIC STABILITY: FOOD INSECURITY: WITHIN THE PAST 12 MONTHS, YOU WORRIED THAT YOUR FOOD WOULD RUN OUT BEFORE YOU GOT MONEY TO BUY MORE.: NEVER TRUE

## 2025-04-03 ASSESSMENT — PATIENT HEALTH QUESTIONNAIRE - PHQ9
3. TROUBLE FALLING OR STAYING ASLEEP: NOT AT ALL
SUM OF ALL RESPONSES TO PHQ QUESTIONS 1-9: 0
2. FEELING DOWN, DEPRESSED OR HOPELESS: NOT AT ALL
4. FEELING TIRED OR HAVING LITTLE ENERGY: NOT AT ALL
1. LITTLE INTEREST OR PLEASURE IN DOING THINGS: NOT AT ALL
9. THOUGHTS THAT YOU WOULD BE BETTER OFF DEAD, OR OF HURTING YOURSELF: NOT AT ALL
8. MOVING OR SPEAKING SO SLOWLY THAT OTHER PEOPLE COULD HAVE NOTICED. OR THE OPPOSITE, BEING SO FIGETY OR RESTLESS THAT YOU HAVE BEEN MOVING AROUND A LOT MORE THAN USUAL: NOT AT ALL
6. FEELING BAD ABOUT YOURSELF - OR THAT YOU ARE A FAILURE OR HAVE LET YOURSELF OR YOUR FAMILY DOWN: NOT AT ALL
SUM OF ALL RESPONSES TO PHQ QUESTIONS 1-9: 0
5. POOR APPETITE OR OVEREATING: NOT AT ALL
SUM OF ALL RESPONSES TO PHQ QUESTIONS 1-9: 0
7. TROUBLE CONCENTRATING ON THINGS, SUCH AS READING THE NEWSPAPER OR WATCHING TELEVISION: NOT AT ALL
SUM OF ALL RESPONSES TO PHQ QUESTIONS 1-9: 0
10. IF YOU CHECKED OFF ANY PROBLEMS, HOW DIFFICULT HAVE THESE PROBLEMS MADE IT FOR YOU TO DO YOUR WORK, TAKE CARE OF THINGS AT HOME, OR GET ALONG WITH OTHER PEOPLE: NOT DIFFICULT AT ALL

## 2025-04-03 NOTE — PROGRESS NOTES
Date of Visit:  4/10/2025  Patient Name: Aziza Lynch   Patient :  1965     CHIEF COMPLAINT:     Aziza Lynch is a 60 y.o. female who presents today for an general visit to be evaluated for the following condition(s):  Chief Complaint   Patient presents with    Hypertension     Pt states yesterday was having ringing in her ears. Her BP has been high she has been stressed recently. Pt has had HA. Denies SOB, dizzy. Readings have been 170/95.        HISTORY OF PRESENT ILLNESS     I reviewed staff HPI/chief complaint and do agree with above    Subjective:  - Patient presents with elevated blood pressure, headaches, and tinnitus (ringing in the ears).  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  - Notes strong-smelling urine, improving with increased water intake.  No other urinary symptoms reported  - Reports history of dizziness a few years ago, prompting cardiac testing with normal results.  - Was previously prescribed Benicar 20 mg for blood pressure, later reduced to 5 mg, then discontinued approximately six months ago due to good blood pressure readings.  - Home blood pressure reading of 170/95.  - Experiences anxiety especially with more recently with elevated blood pressure readings.  - Denies shortness of breath, chest pain, heart palpitations, recent illnesses, fevers, or swelling.  - Reports previous cough with lisinopril.                      REVIEW OF SYSTEM      Review of Systems   Constitutional:  Negative for activity change, appetite change, chills, fatigue and fever.   HENT:  Positive for tinnitus. Negative for congestion, ear discharge, ear pain and rhinorrhea.    Respiratory:  Negative for cough, chest tightness and shortness of breath.    Cardiovascular:  Negative for chest pain, palpitations and

## 2025-04-04 RX ORDER — OLMESARTAN MEDOXOMIL 5 MG/1
5 TABLET ORAL DAILY
Qty: 30 TABLET | Refills: 5 | OUTPATIENT
Start: 2025-04-04

## 2025-04-07 DIAGNOSIS — E55.9 VITAMIN D DEFICIENCY: ICD-10-CM

## 2025-04-07 DIAGNOSIS — I10 ESSENTIAL HYPERTENSION: ICD-10-CM

## 2025-04-07 LAB
ALBUMIN SERPL-MCNC: 3.7 G/DL (ref 3.5–4.6)
ALP SERPL-CCNC: 183 U/L (ref 40–130)
ALT SERPL-CCNC: 11 U/L (ref 0–33)
ANION GAP SERPL CALCULATED.3IONS-SCNC: 10 MEQ/L (ref 9–15)
AST SERPL-CCNC: 24 U/L (ref 0–35)
BASOPHILS # BLD: 0.1 K/UL (ref 0–0.2)
BASOPHILS NFR BLD: 0.9 %
BILIRUB SERPL-MCNC: 0.3 MG/DL (ref 0.2–0.7)
BUN SERPL-MCNC: 11 MG/DL (ref 8–23)
CALCIUM SERPL-MCNC: 8.9 MG/DL (ref 8.5–9.9)
CHLORIDE SERPL-SCNC: 105 MEQ/L (ref 95–107)
CHOLEST SERPL-MCNC: 215 MG/DL (ref 0–199)
CO2 SERPL-SCNC: 25 MEQ/L (ref 20–31)
CREAT SERPL-MCNC: 0.89 MG/DL (ref 0.5–0.9)
EOSINOPHIL # BLD: 0.3 K/UL (ref 0–0.7)
EOSINOPHIL NFR BLD: 3.2 %
ERYTHROCYTE [DISTWIDTH] IN BLOOD BY AUTOMATED COUNT: 12.9 % (ref 11.5–14.5)
GLOBULIN SER CALC-MCNC: 3.7 G/DL (ref 2.3–3.5)
GLUCOSE FASTING: 106 MG/DL (ref 70–99)
HCT VFR BLD AUTO: 41.9 % (ref 37–47)
HDLC SERPL-MCNC: 42 MG/DL (ref 40–59)
HGB BLD-MCNC: 13.5 G/DL (ref 12–16)
LDLC SERPL CALC-MCNC: 148 MG/DL (ref 0–129)
LYMPHOCYTES # BLD: 2.8 K/UL (ref 1–4.8)
LYMPHOCYTES NFR BLD: 35.3 %
MCH RBC QN AUTO: 29.5 PG (ref 27–31.3)
MCHC RBC AUTO-ENTMCNC: 32.2 % (ref 33–37)
MCV RBC AUTO: 91.7 FL (ref 79.4–94.8)
MONOCYTES # BLD: 0.6 K/UL (ref 0.2–0.8)
MONOCYTES NFR BLD: 7.2 %
NEUTROPHILS # BLD: 4.2 K/UL (ref 1.4–6.5)
NEUTS SEG NFR BLD: 52.9 %
PLATELET # BLD AUTO: 336 K/UL (ref 130–400)
POTASSIUM SERPL-SCNC: 4.4 MEQ/L (ref 3.4–4.9)
PROT SERPL-MCNC: 7.4 G/DL (ref 6.3–8)
RBC # BLD AUTO: 4.57 M/UL (ref 4.2–5.4)
SODIUM SERPL-SCNC: 140 MEQ/L (ref 135–144)
TRIGL SERPL-MCNC: 124 MG/DL (ref 0–150)
WBC # BLD AUTO: 7.9 K/UL (ref 4.8–10.8)

## 2025-04-08 LAB — VITAMIN D 25-HYDROXY: 40.5 NG/ML (ref 30–100)

## 2025-04-09 ENCOUNTER — RESULTS FOLLOW-UP (OUTPATIENT)
Dept: FAMILY MEDICINE CLINIC | Age: 60
End: 2025-04-09

## 2025-04-10 ASSESSMENT — ENCOUNTER SYMPTOMS
VOMITING: 0
DIARRHEA: 0
NAUSEA: 0
BLOOD IN STOOL: 0
CONSTIPATION: 0
RHINORRHEA: 0
ABDOMINAL PAIN: 0
COUGH: 0
CHEST TIGHTNESS: 0
COLOR CHANGE: 0
SHORTNESS OF BREATH: 0

## 2025-05-05 ENCOUNTER — OFFICE VISIT (OUTPATIENT)
Dept: FAMILY MEDICINE CLINIC | Age: 60
End: 2025-05-05
Payer: COMMERCIAL

## 2025-05-05 VITALS
OXYGEN SATURATION: 97 % | TEMPERATURE: 98 F | SYSTOLIC BLOOD PRESSURE: 118 MMHG | DIASTOLIC BLOOD PRESSURE: 84 MMHG | WEIGHT: 205.8 LBS | HEART RATE: 63 BPM | BODY MASS INDEX: 36.46 KG/M2 | HEIGHT: 63 IN

## 2025-05-05 DIAGNOSIS — K76.0 HEPATIC STEATOSIS: ICD-10-CM

## 2025-05-05 DIAGNOSIS — I10 ESSENTIAL HYPERTENSION: Primary | ICD-10-CM

## 2025-05-05 DIAGNOSIS — R74.8 ELEVATED LIVER ENZYMES: ICD-10-CM

## 2025-05-05 DIAGNOSIS — R63.5 ABNORMAL WEIGHT GAIN: ICD-10-CM

## 2025-05-05 PROCEDURE — 99214 OFFICE O/P EST MOD 30 MIN: CPT | Performed by: NURSE PRACTITIONER

## 2025-05-05 PROCEDURE — 3079F DIAST BP 80-89 MM HG: CPT | Performed by: NURSE PRACTITIONER

## 2025-05-05 PROCEDURE — 3074F SYST BP LT 130 MM HG: CPT | Performed by: NURSE PRACTITIONER

## 2025-05-05 RX ORDER — PHENTERMINE HYDROCHLORIDE 15 MG/1
15 CAPSULE ORAL EVERY MORNING
Qty: 30 CAPSULE | Refills: 0 | Status: SHIPPED | OUTPATIENT
Start: 2025-05-05 | End: 2025-06-04

## 2025-05-05 RX ORDER — OLMESARTAN MEDOXOMIL 20 MG/1
20 TABLET ORAL DAILY
Qty: 90 TABLET | Refills: 1 | Status: SHIPPED | OUTPATIENT
Start: 2025-05-05

## 2025-05-05 NOTE — PROGRESS NOTES
Date of Visit:  2025  Patient Name: Aziza Lynch   Patient :  1965     CHIEF COMPLAINT:     Aziza Lynch is a 60 y.o. female who presents today for an general visit to be evaluated for the following condition(s):  Chief Complaint   Patient presents with    Establish Care     Last Seen- 2023  Labs- 2025   Hospital/ ED visits- N    She would like to go over blood work and BP medication. She does check BP every couple days. Readings- 120/80s denied HA. Lightheaded, SOB, chest pains.        HISTORY OF PRESENT ILLNESS     I reviewed staff HPI/chief complaint and do agree with above    Past Medical History:  - History of elevated liver enzymes, with previous fibroscan showing fatty liver without evidence of damage.  - Previous positive YULY and autoimmune markers without clinical disease manifestation per rheumatology evaluation.  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  - Previous elbow fracture requiring surgery.  - Borderline blood glucose level of 106.  - Thyroid function tested in September, results were normal (mid-range).      Subjective:  - Patient presents for follow-up on blood pressure management and to discuss recent lab results.  - Reports significant improvement in blood pressure with current medication (Benicar 20mg).  - Experiencing patches of hair loss and thinning over the past year, currently receiving steroid injections from dermatologist with some improvement. Inquiring about Spironolactone as potential treatment for hair regrowth.  - Denies headaches, lightheadedness, shortness of breath, chest pain, or palpitations.  - Reports recent life stressors including daughter starting college, office acquisition, and daughter's wedding which has recently concluded.  - Expresses interest in weight loss medication. Previously had success with Adipex but discontinued due to sleep disturbance (waking in middle of night). Also

## 2025-05-15 ASSESSMENT — ENCOUNTER SYMPTOMS
BLOOD IN STOOL: 0
CONSTIPATION: 0
SHORTNESS OF BREATH: 0
VOMITING: 0
CHEST TIGHTNESS: 0
ABDOMINAL PAIN: 0
COLOR CHANGE: 0
NAUSEA: 0
DIARRHEA: 0
COUGH: 0

## 2025-06-02 ENCOUNTER — OFFICE VISIT (OUTPATIENT)
Dept: FAMILY MEDICINE CLINIC | Age: 60
End: 2025-06-02
Payer: COMMERCIAL

## 2025-06-02 VITALS
WEIGHT: 199.4 LBS | HEART RATE: 63 BPM | BODY MASS INDEX: 35.33 KG/M2 | DIASTOLIC BLOOD PRESSURE: 76 MMHG | HEIGHT: 63 IN | SYSTOLIC BLOOD PRESSURE: 124 MMHG | TEMPERATURE: 98 F | OXYGEN SATURATION: 98 %

## 2025-06-02 DIAGNOSIS — R63.5 ABNORMAL WEIGHT GAIN: ICD-10-CM

## 2025-06-02 DIAGNOSIS — L65.9 HAIR LOSS: ICD-10-CM

## 2025-06-02 DIAGNOSIS — K76.0 HEPATIC STEATOSIS: ICD-10-CM

## 2025-06-02 DIAGNOSIS — I10 PRIMARY HYPERTENSION: Primary | ICD-10-CM

## 2025-06-02 PROCEDURE — 3074F SYST BP LT 130 MM HG: CPT | Performed by: NURSE PRACTITIONER

## 2025-06-02 PROCEDURE — 3078F DIAST BP <80 MM HG: CPT | Performed by: NURSE PRACTITIONER

## 2025-06-02 PROCEDURE — 99214 OFFICE O/P EST MOD 30 MIN: CPT | Performed by: NURSE PRACTITIONER

## 2025-06-02 RX ORDER — SPIRONOLACTONE 50 MG/1
50 TABLET, FILM COATED ORAL DAILY
Qty: 30 TABLET | Refills: 5 | Status: SHIPPED | OUTPATIENT
Start: 2025-06-02

## 2025-06-02 RX ORDER — PHENTERMINE HYDROCHLORIDE 15 MG/1
15 CAPSULE ORAL EVERY MORNING
Qty: 30 CAPSULE | Refills: 0 | Status: SHIPPED | OUTPATIENT
Start: 2025-06-02 | End: 2025-07-02

## 2025-06-02 ASSESSMENT — ENCOUNTER SYMPTOMS
CONSTIPATION: 0
DIARRHEA: 0
SHORTNESS OF BREATH: 0
ABDOMINAL PAIN: 0
COLOR CHANGE: 0
COUGH: 0
BLOOD IN STOOL: 0
NAUSEA: 0
VOMITING: 0
CHEST TIGHTNESS: 0

## 2025-06-02 NOTE — PROGRESS NOTES
Date of Visit:  2025  Patient Name: Aziza Lynch   Patient :  1965     CHIEF COMPLAINT:     Aziza Lynch is a 60 y.o. female who presents today for an general visit to be evaluated for the following condition(s):  Chief Complaint   Patient presents with    Follow-up     Continues on Adipex she states no issues since she started. States seems to be working. Also would like to discuss BP medication that was discussed at her last appt.        HISTORY OF PRESENT ILLNESS     I reviewed staff HPI/chief complaint and do agree with above.    Subjective:  - Patient reports taking Adipex 15 mg mg for weight loss, noting it is not as strong as the full dose but has been working \"semi-well.\" Patient has lost approximately 6 pounds in 3 weeks.  - No reported side effects with current medication: no trouble sleeping, no mood changes or anxiety, no heart palpitations. Patient mentions experiencing heart palpitations with previous prescription of Adipex 37.5 mg   - Blood pressure has been well-controlled on the Benicar and denies any noted hypertensive symptoms or side effects to the medication.  - Patient reports continued hair shedding and inquires about switching from current blood pressure medication (Benicar) to spironolactone, which could potentially address both hypertension and hair loss.  - She has had her thyroid hormone checked frequently in the past by her OB/GYN, last checked approximately 9 months ago and was within normal limits.  She denies any other symptoms such as heat or cold intolerances, skin changes.              REVIEW OF SYSTEM      Review of Systems   Constitutional:  Negative for activity change, appetite change, chills, fatigue and fever.   Respiratory:  Negative for cough, chest tightness and shortness of breath.    Cardiovascular:  Negative for chest pain, palpitations and leg swelling.   Gastrointestinal:  Negative for abdominal pain, blood in stool, constipation, diarrhea,

## 2025-06-30 ENCOUNTER — OFFICE VISIT (OUTPATIENT)
Dept: FAMILY MEDICINE CLINIC | Age: 60
End: 2025-06-30
Payer: COMMERCIAL

## 2025-06-30 VITALS
DIASTOLIC BLOOD PRESSURE: 74 MMHG | TEMPERATURE: 97.9 F | HEIGHT: 63 IN | HEART RATE: 65 BPM | WEIGHT: 189.6 LBS | OXYGEN SATURATION: 97 % | BODY MASS INDEX: 33.59 KG/M2 | SYSTOLIC BLOOD PRESSURE: 126 MMHG

## 2025-06-30 DIAGNOSIS — L65.9 HAIR LOSS: ICD-10-CM

## 2025-06-30 DIAGNOSIS — R63.5 ABNORMAL WEIGHT GAIN: ICD-10-CM

## 2025-06-30 DIAGNOSIS — I10 PRIMARY HYPERTENSION: ICD-10-CM

## 2025-06-30 DIAGNOSIS — K76.0 HEPATIC STEATOSIS: ICD-10-CM

## 2025-06-30 PROCEDURE — 99214 OFFICE O/P EST MOD 30 MIN: CPT | Performed by: NURSE PRACTITIONER

## 2025-06-30 PROCEDURE — 3074F SYST BP LT 130 MM HG: CPT | Performed by: NURSE PRACTITIONER

## 2025-06-30 PROCEDURE — 3078F DIAST BP <80 MM HG: CPT | Performed by: NURSE PRACTITIONER

## 2025-06-30 RX ORDER — SPIRONOLACTONE 50 MG/1
50 TABLET, FILM COATED ORAL DAILY
Qty: 30 TABLET | Refills: 5 | Status: SHIPPED | OUTPATIENT
Start: 2025-06-30

## 2025-06-30 RX ORDER — PHENTERMINE HYDROCHLORIDE 15 MG/1
15 CAPSULE ORAL EVERY MORNING
Qty: 30 CAPSULE | Refills: 2 | Status: SHIPPED | OUTPATIENT
Start: 2025-06-30 | End: 2025-09-28

## 2025-06-30 ASSESSMENT — ENCOUNTER SYMPTOMS
COUGH: 0
CHEST TIGHTNESS: 0
COLOR CHANGE: 0
VOMITING: 0
SHORTNESS OF BREATH: 0
DIARRHEA: 0
ABDOMINAL PAIN: 0
CONSTIPATION: 0
NAUSEA: 0

## 2025-06-30 NOTE — PROGRESS NOTES
Date of Visit:  2025  Patient Name: Aziza Lynch   Patient :  1965     CHIEF COMPLAINT:     Aziza Lynch is a 60 y.o. female who presents today for an general visit to be evaluated for the following condition(s):  Chief Complaint   Patient presents with    Follow-up     She continues on the Adipex/ spironolactone. Is doing well on the Adipex.   Not checking BP recently. No issues with the spironolactone.        HISTORY OF PRESENT ILLNESS     I reviewed staff HPI/chief complaint and do agree with above    Subjective:  - Follow-up visit for medication management of Adipex and spironolactone.  -Adipex continue to be effective for weight loss of ten pounds since the last visit, from 199 lbs to 189 lbs. Also reports improvement in hair loss, noting a significant reduction in hair shedding in the shower while on the spironolactone.  - Attributes the improvement in hair loss to a combination of spironolactone and self-initiated use of pumpkin seed oil  - Reports that since starting spironolactone, has noticed a reduction in peripheral edema that was usually caused by the warmer weather and at the end of the day after being on feet for long periods of time.  - Patient denies any sleep disturbances, mood changes, heart palpitations, myalgias/muscle cramping, shortness of breath/troubles breathing, any changes in bowel or bladder, any nausea/vomiting with medications.                REVIEW OF SYSTEM      Review of Systems   Constitutional:  Negative for activity change, appetite change, chills, fatigue and fever.   Respiratory:  Negative for cough, chest tightness and shortness of breath.    Cardiovascular:  Negative for chest pain, palpitations and leg swelling.   Gastrointestinal:  Negative for abdominal pain, constipation, diarrhea, nausea and vomiting.   Musculoskeletal:  Negative for arthralgias, joint swelling and myalgias.   Skin:  Negative for color change and rash.   Neurological:

## 2025-08-05 ENCOUNTER — TRANSCRIBE ORDERS (OUTPATIENT)
Dept: WOMENS IMAGING | Age: 60
End: 2025-08-05

## 2025-08-05 ENCOUNTER — HOSPITAL ENCOUNTER (OUTPATIENT)
Dept: WOMENS IMAGING | Age: 60
Discharge: HOME | End: 2025-08-07
Payer: COMMERCIAL

## 2025-08-05 DIAGNOSIS — Z12.31 SCREENING MAMMOGRAM FOR BREAST CANCER: ICD-10-CM

## 2025-08-05 DIAGNOSIS — Z12.31 SCREENING MAMMOGRAM FOR BREAST CANCER: Primary | ICD-10-CM

## 2025-08-05 PROCEDURE — 77063 BREAST TOMOSYNTHESIS BI: CPT

## (undated) DEVICE — DRILL BIT, 2.0MM, QC, 140MM, W/DEPTH MARK

## (undated) DEVICE — SLING, ARM, LARGE

## (undated) DEVICE — DRESSING, TEGADERM, CHG, 4 X 4.5 IN

## (undated) DEVICE — DRESSING, GAUZE, 16 PLY, 4 X 4 IN, STERILE

## (undated) DEVICE — Device

## (undated) DEVICE — GLOVE, SURGICAL, PROTEXIS PI W/NEU-THERA, 7.5, PF, LF

## (undated) DEVICE — BANDAGE, ELASTIC, SELF-CLOSE, 4 IN, HONEYCOMB, STERILE

## (undated) DEVICE — SPLINT, FAST SETTING, 3 X 15 IN, PLASTER, BLUE

## (undated) DEVICE — PADDING, WEBRIL, UNDERCAST, STERILE, 2IN

## (undated) DEVICE — SOLUTION, IRRIGATION, SODIUM CHLORIDE 0.9%, 1000 ML, POUR BOTTLE

## (undated) DEVICE — DRESSING, GAUZE, PETROLATUM, XEROFORM, 5 X 9 IN, STERILE

## (undated) DEVICE — BANDAGE, ELASTIC, SELF-CLOSE, 3 IN, HONEYCOMB, STERILE

## (undated) DEVICE — GLOVE, SURGICAL, PROTEXIS PI BLUE W/NEUTHERA, 7.5, PF, LF

## (undated) DEVICE — BIT, DRILL, QUICK-COUPLING, 2.5 X 110 MM, STAINLESS STEEL, GOLD

## (undated) DEVICE — SUTURE, STRATAFIX, 3-0, SPIRAL MONOCRYL PLUS, PS, 70CM, UNDYED

## (undated) DEVICE — DRESSING, ABDOMINAL, WET PRUF, TENDERSORB, 5 X 9 IN, STERILE

## (undated) DEVICE — DRAPE, C-ARM 16 X 85

## (undated) DEVICE — CAUTERY, PENCIL, PUSH BUTTON, SMOKE EVAC, 70MM

## (undated) DEVICE — DRESSING, MEPILEX BORDER, POST-OP AG, 4 X 12 IN

## (undated) DEVICE — SOLUTION, IRRIGATION, STERILE WATER, 1000 ML, POUR BOTTLE

## (undated) DEVICE — SUTURE, VICYL 2-0, TAPER POINT, SH VIOLET 8-18 INCH

## (undated) DEVICE — APPLICATOR, CHLORAPREP, W/ORANGE TINT, 26ML

## (undated) DEVICE — PADDING, UNDERCAST, WEBRIL, 3 IN X 4 YD, REG, NS